# Patient Record
Sex: FEMALE | URBAN - METROPOLITAN AREA
[De-identification: names, ages, dates, MRNs, and addresses within clinical notes are randomized per-mention and may not be internally consistent; named-entity substitution may affect disease eponyms.]

---

## 2019-06-11 ENCOUNTER — OUTPATIENT (OUTPATIENT)
Dept: OUTPATIENT SERVICES | Facility: HOSPITAL | Age: 49
LOS: 1 days | End: 2019-06-11

## 2019-06-25 ENCOUNTER — OUTPATIENT (OUTPATIENT)
Dept: OUTPATIENT SERVICES | Facility: HOSPITAL | Age: 49
LOS: 1 days | End: 2019-06-25

## 2019-07-23 ENCOUNTER — OUTPATIENT (OUTPATIENT)
Dept: OUTPATIENT SERVICES | Facility: HOSPITAL | Age: 49
LOS: 1 days | End: 2019-07-23

## 2020-02-10 PROBLEM — Z00.00 ENCOUNTER FOR PREVENTIVE HEALTH EXAMINATION: Status: ACTIVE | Noted: 2020-02-10

## 2020-02-14 ENCOUNTER — APPOINTMENT (OUTPATIENT)
Dept: BARIATRICS | Facility: CLINIC | Age: 50
End: 2020-02-14
Payer: COMMERCIAL

## 2020-02-14 VITALS
HEIGHT: 67 IN | BODY MASS INDEX: 45.99 KG/M2 | DIASTOLIC BLOOD PRESSURE: 74 MMHG | SYSTOLIC BLOOD PRESSURE: 115 MMHG | WEIGHT: 293 LBS | HEART RATE: 90 BPM

## 2020-02-14 VITALS
HEART RATE: 90 BPM | SYSTOLIC BLOOD PRESSURE: 115 MMHG | BODY MASS INDEX: 45.99 KG/M2 | DIASTOLIC BLOOD PRESSURE: 74 MMHG | WEIGHT: 293 LBS | HEIGHT: 67 IN

## 2020-02-14 DIAGNOSIS — F41.9 ANXIETY DISORDER, UNSPECIFIED: ICD-10-CM

## 2020-02-14 DIAGNOSIS — Z87.09 PERSONAL HISTORY OF OTHER DISEASES OF THE RESPIRATORY SYSTEM: ICD-10-CM

## 2020-02-14 DIAGNOSIS — K52.9 NONINFECTIVE GASTROENTERITIS AND COLITIS, UNSPECIFIED: ICD-10-CM

## 2020-02-14 DIAGNOSIS — F32.9 ANXIETY DISORDER, UNSPECIFIED: ICD-10-CM

## 2020-02-14 DIAGNOSIS — Z80.0 FAMILY HISTORY OF MALIGNANT NEOPLASM OF DIGESTIVE ORGANS: ICD-10-CM

## 2020-02-14 DIAGNOSIS — Z78.9 OTHER SPECIFIED HEALTH STATUS: ICD-10-CM

## 2020-02-14 DIAGNOSIS — Z87.891 PERSONAL HISTORY OF NICOTINE DEPENDENCE: ICD-10-CM

## 2020-02-14 DIAGNOSIS — M47.819 SPONDYLOSIS W/OUT MYELOPATHY OR RADICULOPATHY, SITE UNSPECIFIED: ICD-10-CM

## 2020-02-14 DIAGNOSIS — Z87.442 PERSONAL HISTORY OF URINARY CALCULI: ICD-10-CM

## 2020-02-14 DIAGNOSIS — N39.0 URINARY TRACT INFECTION, SITE NOT SPECIFIED: ICD-10-CM

## 2020-02-14 DIAGNOSIS — Z83.3 FAMILY HISTORY OF DIABETES MELLITUS: ICD-10-CM

## 2020-02-14 DIAGNOSIS — Z80.3 FAMILY HISTORY OF MALIGNANT NEOPLASM OF BREAST: ICD-10-CM

## 2020-02-14 DIAGNOSIS — Z98.84 BARIATRIC SURGERY STATUS: ICD-10-CM

## 2020-02-14 DIAGNOSIS — G89.4 CHRONIC PAIN SYNDROME: ICD-10-CM

## 2020-02-14 DIAGNOSIS — Z80.42 FAMILY HISTORY OF MALIGNANT NEOPLASM OF PROSTATE: ICD-10-CM

## 2020-02-14 PROCEDURE — 99203 OFFICE O/P NEW LOW 30 MIN: CPT

## 2020-02-14 PROCEDURE — 99205 OFFICE O/P NEW HI 60 MIN: CPT

## 2020-02-14 RX ORDER — ALBUTEROL SULFATE 4 MG/1
TABLET ORAL
Refills: 0 | Status: ACTIVE | COMMUNITY

## 2020-02-14 NOTE — HISTORY OF PRESENT ILLNESS
[FreeTextEntry1] : 49 year old female for obesity medicine consultation.\par Patient saw Dr. Jones earlier today- not a candidate for revision surgery.  Had open DS in 2002 with revisions and hernia operations since. \par HIghest weight prior to surgery 330 Lowest weight after surgery 140-150\par Was maintain her weight until she developed severe pain and was diagnosed with an autoimmune condition leading to difficuse pain in her back  and knees.  Has seen pain management and was given ablation in her back x2 without success.  Now not able to exercise at all\par Food recall- B- [employer] bread and butter L- tuna with sesay and pickles D leftover rice, chicken and broccoli- has snack between Breakfast and lunch shake or mixed nuts\par +emotional eating at times +depression to deal with pain- seeing LCSW monthly\par Sleeps only 1 hour at a time due to pain and nocturia\par Occupation- administrative at [employer]\par +ETOH to help cope with pain prior to bed 1-3 drinks \par Previous commercial diets- Nutrisystem, WW, medifast

## 2020-02-14 NOTE — HISTORY OF PRESENT ILLNESS
[de-identified] : Paulino is a 49 year old female who 17 years ago had an open DS my Dr Zepeda who generally does 100 cm channel since that time. She has had multiple hernia operations with mesh, also been diagnosed with an autoimmune disease and has had considerable weight recidivism in the last few years. She did have a nominal attempt with re-sleeve. Additionally following this procedure she did not lose any weight. She is referred by Dr. Disla of [facility]. i reviewed her upper GI which shows a reasonable sleeve that would be at best a nominal target I believe that her intestine has hypertrophied and again would be very difficult to find a therapeutic range making this shorter. I spent considerable time with her today and really believe that the target for surgery are nominal and with her past surgical history the complication rate high and best treatment would be medical therapy and continue to follow up. I also believe that her immunosuppression has a contributory cause and manipulation of the gut bacteria may become a target in the future thus feel strongly that the risk of surgical re-intervention on an elective basis exceeds the likely benefit of the potential targets with her previous surgery. I saw her today with Jennifer Souza and we discussed medical options and this may require a combination of treatments and reasonable expectation management.

## 2020-02-14 NOTE — ASSESSMENT
[FreeTextEntry1] : Patient with complicated surgical and medical history now with weight regain after bariatric surgery not a candidate for a revision\par Patient unable to exercise due to pain in legs- discussed other pain medication options including Medical marijuana.  If able to wean off Cymbalta can consider Contrave and phentermine in the future\par IF FBG elevated or signs of high insulin may also consider metformin\par Has tried Saxenda in the past but she only lost 8 pounds over three months and was told that it contributed to a kidney infection \par Not a candidate for topiramate due to history of kidney stones \par Encouraged patient to find RD Closer to where she lives\par Encouraged her to take up swimming \par Labs fasting to be drawn including 1mg dex suppression test- will call with results

## 2020-02-14 NOTE — PHYSICAL EXAM
[Obese, well nourished, in no acute distress] : obese, well nourished, in no acute distress [Normal] : affect appropriate [de-identified] : +teary

## 2021-11-03 ENCOUNTER — OUTPATIENT (OUTPATIENT)
Dept: OUTPATIENT SERVICES | Facility: HOSPITAL | Age: 51
LOS: 1 days | End: 2021-11-03
Payer: COMMERCIAL

## 2021-11-03 DIAGNOSIS — M81.0 AGE-RELATED OSTEOPOROSIS WITHOUT CURRENT PATHOLOGICAL FRACTURE: ICD-10-CM

## 2021-11-03 DIAGNOSIS — R53.83 OTHER FATIGUE: ICD-10-CM

## 2021-11-03 DIAGNOSIS — M06.89 OTHER SPECIFIED RHEUMATOID ARTHRITIS, MULTIPLE SITES: ICD-10-CM

## 2021-11-03 DIAGNOSIS — E55.9 VITAMIN D DEFICIENCY, UNSPECIFIED: ICD-10-CM

## 2021-11-03 DIAGNOSIS — Z11.1 ENCOUNTER FOR SCREENING FOR RESPIRATORY TUBERCULOSIS: ICD-10-CM

## 2021-11-03 DIAGNOSIS — M06.09 RHEUMATOID ARTHRITIS WITHOUT RHEUMATOID FACTOR, MULTIPLE SITES: ICD-10-CM

## 2021-11-03 DIAGNOSIS — E78.00 PURE HYPERCHOLESTEROLEMIA, UNSPECIFIED: ICD-10-CM

## 2021-11-03 DIAGNOSIS — R68.89 OTHER GENERAL SYMPTOMS AND SIGNS: ICD-10-CM

## 2021-11-03 DIAGNOSIS — Z79.899 OTHER LONG TERM (CURRENT) DRUG THERAPY: ICD-10-CM

## 2021-11-03 DIAGNOSIS — Z11.59 ENCOUNTER FOR SCREENING FOR OTHER VIRAL DISEASES: ICD-10-CM

## 2021-11-03 DIAGNOSIS — R79.82 ELEVATED C-REACTIVE PROTEIN (CRP): ICD-10-CM

## 2021-11-03 PROCEDURE — 85027 COMPLETE CBC AUTOMATED: CPT

## 2021-11-03 PROCEDURE — 80053 COMPREHEN METABOLIC PANEL: CPT

## 2021-11-03 PROCEDURE — 81490 AUTOIMMUNE RA ALYS 12 BMRK: CPT

## 2021-11-03 PROCEDURE — 86769 SARS-COV-2 COVID-19 ANTIBODY: CPT

## 2021-11-03 PROCEDURE — 80061 LIPID PANEL: CPT

## 2021-11-03 PROCEDURE — 36415 COLL VENOUS BLD VENIPUNCTURE: CPT

## 2021-11-04 ENCOUNTER — TRANSCRIPTION ENCOUNTER (OUTPATIENT)
Age: 51
End: 2021-11-04

## 2022-05-16 ENCOUNTER — APPOINTMENT (OUTPATIENT)
Dept: OPHTHALMOLOGY | Facility: CLINIC | Age: 52
End: 2022-05-16
Payer: COMMERCIAL

## 2022-05-16 ENCOUNTER — NON-APPOINTMENT (OUTPATIENT)
Age: 52
End: 2022-05-16

## 2022-05-16 PROCEDURE — 92002 INTRM OPH EXAM NEW PATIENT: CPT

## 2022-06-15 ENCOUNTER — APPOINTMENT (OUTPATIENT)
Dept: OPHTHALMOLOGY | Facility: CLINIC | Age: 52
End: 2022-06-15
Payer: COMMERCIAL

## 2022-06-15 ENCOUNTER — NON-APPOINTMENT (OUTPATIENT)
Age: 52
End: 2022-06-15

## 2022-06-15 PROCEDURE — 99213 OFFICE O/P EST LOW 20 MIN: CPT

## 2022-09-21 ENCOUNTER — RESULT REVIEW (OUTPATIENT)
Age: 52
End: 2022-09-21

## 2022-09-21 ENCOUNTER — APPOINTMENT (OUTPATIENT)
Dept: WOUND CARE | Facility: HOSPITAL | Age: 52
End: 2022-09-21

## 2022-09-21 ENCOUNTER — OUTPATIENT (OUTPATIENT)
Dept: OUTPATIENT SERVICES | Facility: HOSPITAL | Age: 52
LOS: 1 days | Discharge: HOME | End: 2022-09-21
Payer: COMMERCIAL

## 2022-09-21 VITALS
HEART RATE: 76 BPM | RESPIRATION RATE: 20 BRPM | TEMPERATURE: 97 F | HEIGHT: 60 IN | SYSTOLIC BLOOD PRESSURE: 135 MMHG | DIASTOLIC BLOOD PRESSURE: 83 MMHG | OXYGEN SATURATION: 98 %

## 2022-09-21 DIAGNOSIS — Z78.9 OTHER SPECIFIED HEALTH STATUS: ICD-10-CM

## 2022-09-21 DIAGNOSIS — B07.9 VIRAL WART, UNSPECIFIED: ICD-10-CM

## 2022-09-21 DIAGNOSIS — S60.949A UNSPECIFIED SUPERFICIAL INJURY OF UNSPECIFIED FINGER, INITIAL ENCOUNTER: ICD-10-CM

## 2022-09-21 PROCEDURE — 88304 TISSUE EXAM BY PATHOLOGIST: CPT

## 2022-09-21 PROCEDURE — 88304 TISSUE EXAM BY PATHOLOGIST: CPT | Mod: 26

## 2022-09-21 PROCEDURE — 11102 TANGNTL BX SKIN SINGLE LES: CPT

## 2022-09-21 PROCEDURE — G0463: CPT | Mod: 25

## 2022-09-21 PROCEDURE — 99203 OFFICE O/P NEW LOW 30 MIN: CPT | Mod: 25

## 2022-09-21 RX ORDER — DEXAMETHASONE 1 MG/1
1 TABLET ORAL
Qty: 1 | Refills: 0 | Status: COMPLETED | COMMUNITY
Start: 2020-02-14 | End: 2022-09-21

## 2022-09-21 RX ORDER — UPADACITINIB 15 MG/1
15 TABLET, EXTENDED RELEASE ORAL DAILY
Refills: 0 | Status: ACTIVE | COMMUNITY

## 2022-09-21 RX ORDER — USTEKINUMAB 130 MG/26ML
130 SOLUTION INTRAVENOUS
Refills: 0 | Status: COMPLETED | COMMUNITY
End: 2022-09-21

## 2022-09-21 RX ORDER — DULOXETINE HYDROCHLORIDE 60 MG/1
60 CAPSULE, DELAYED RELEASE ORAL
Refills: 0 | Status: COMPLETED | COMMUNITY
End: 2022-09-21

## 2022-09-30 DIAGNOSIS — Z88.8 ALLERGY STATUS TO OTHER DRUGS, MEDICAMENTS AND BIOLOGICAL SUBSTANCES STATUS: ICD-10-CM

## 2022-09-30 DIAGNOSIS — B07.9 VIRAL WART, UNSPECIFIED: ICD-10-CM

## 2022-09-30 DIAGNOSIS — G89.4 CHRONIC PAIN SYNDROME: ICD-10-CM

## 2022-09-30 DIAGNOSIS — Z90.710 ACQUIRED ABSENCE OF BOTH CERVIX AND UTERUS: ICD-10-CM

## 2022-09-30 DIAGNOSIS — Z98.890 OTHER SPECIFIED POSTPROCEDURAL STATES: ICD-10-CM

## 2022-09-30 DIAGNOSIS — Z79.899 OTHER LONG TERM (CURRENT) DRUG THERAPY: ICD-10-CM

## 2022-09-30 DIAGNOSIS — J45.909 UNSPECIFIED ASTHMA, UNCOMPLICATED: ICD-10-CM

## 2022-09-30 DIAGNOSIS — F32.A DEPRESSION, UNSPECIFIED: ICD-10-CM

## 2022-09-30 DIAGNOSIS — Z87.442 PERSONAL HISTORY OF URINARY CALCULI: ICD-10-CM

## 2022-09-30 DIAGNOSIS — F41.9 ANXIETY DISORDER, UNSPECIFIED: ICD-10-CM

## 2022-10-11 NOTE — ASSESSMENT
[Verbal] : Verbal [Written] : Written [Demo] : Demo [Patient] : Patient [Good - alert, interested, motivated] : Good - alert, interested, motivated [Verbalizes knowledge/Understanding] : Verbalizes knowledge/understanding [Skin Care] : skin care [Signs and symptoms of infection] : sign and symptoms of infection [Nutrition] : nutrition [How and When to Call] : how and when to call [Labs and Tests] : labs and tests [Patient responsibility to plan of care] : patient responsibility to plan of care [Stable] : stable [Other: ____] : [unfilled] [Ambulatory] : Ambulatory [Not Applicable - Long Term Care/Home Health Agency] : Long Term Care/Home Health Agency: Not Applicable [] : No [FreeTextEntry2] : Infection Prevention\par Dressing changes\par Pathology [FreeTextEntry3] : Initial Visit [FreeTextEntry4] : Auth submitted for same day skin biopsy.\par Pathology obtained and sent to lab\par F/u in 1 week

## 2022-10-11 NOTE — PROCEDURE
[FreeTextEntry9] : 4069 [de-identified] : small fissue right radial index finger [de-identified] : juan m summers [FreeTextEntry6] : irregular skin growth right index finger [FreeTextEntry7] : same [de-identified] : lidocaine digital block [de-identified] : 1cc [de-identified] : yes and sent to pathology

## 2022-10-11 NOTE — PHYSICAL EXAM
[2 x 2] : 2 x 2  [Normal Heart Sounds] : normal heart sounds [Alert] : alert [Oriented to Person] : oriented to person [Oriented to Place] : oriented to place [Oriented to Time] : oriented to time [Calm] : calm [de-identified] : obese 53 Y/O white female in NAD [de-identified] : 3mm fissure right distal radial index finger in callus area [de-identified] : Pathology obtained and sent to lab [FreeTextEntry1] : Right Hand 2nd Digit ; Fissure [de-identified] : none [de-identified] : Other [de-identified] : none [de-identified] : none [de-identified] : 1 [de-identified] : Mupirocin [de-identified] : \par \par \par Mechanically cleansed with sterile gauze and normal saline 0.9%\par Dry Dressing\par  [de-identified] : No [de-identified] : Normal [de-identified] : None [de-identified] : No [de-identified] : 1% Lidocaine Injection [TWNoteComboBox7] : Other [de-identified] : Biopsy taken and sent for pathology [de-identified] : Daily [de-identified] : Primary Dressing

## 2022-10-11 NOTE — PROCEDURE
[FreeTextEntry9] : 7532 [de-identified] : small fissue right radial index finger [de-identified] : juan m summers [FreeTextEntry6] : irregular skin growth right index finger [FreeTextEntry7] : same [de-identified] : lidocaine digital block [de-identified] : 1cc [de-identified] : yes and sent to pathology

## 2022-10-11 NOTE — REVIEW OF SYSTEMS
[Negative] : Heme/Lymph [FreeTextEntry1] : 0685 [FreeTextEntry2] : obesity [FreeTextEntry9] : rheumatoid arthitis [de-identified] : small lesion right index finger

## 2022-10-11 NOTE — HISTORY OF PRESENT ILLNESS
[FreeTextEntry1] : 53 Y/O white female with 20+ year history of a recurrent growth removed in Dermatologists office on several occasions with liquid nitrogen. Most recently patient attempted self excision with nail clipper. Patient relates that \par area is occasionally painful. Area on finger is also pressure point from writing instrument.Planned for shave excision with cauterization under digital block with plain lidocaine.

## 2022-10-11 NOTE — PLAN
[FreeTextEntry1] : plan shave biopsy/excision of fissured area to pathology and cauterization with digital block 1% plain lidocaine\par mupericin and bandaid right index finger.\par return 1 week\par 30 minutes spent in review,evaluation and treatment planning

## 2022-10-11 NOTE — PHYSICAL EXAM
[2 x 2] : 2 x 2  [Normal Heart Sounds] : normal heart sounds [Alert] : alert [Oriented to Person] : oriented to person [Oriented to Place] : oriented to place [Oriented to Time] : oriented to time [Calm] : calm [de-identified] : obese 51 Y/O white female in NAD [de-identified] : 3mm fissure right distal radial index finger in callus area [de-identified] : Pathology obtained and sent to lab [FreeTextEntry1] : Right Hand 2nd Digit ; Fissure [de-identified] : none [de-identified] : Other [de-identified] : none [de-identified] : none [de-identified] : 1 [de-identified] : Mupirocin [de-identified] : \par \par \par Mechanically cleansed with sterile gauze and normal saline 0.9%\par Dry Dressing\par  [de-identified] : No [de-identified] : Normal [de-identified] : None [de-identified] : No [de-identified] : 1% Lidocaine Injection [TWNoteComboBox7] : Other [de-identified] : Biopsy taken and sent for pathology [de-identified] : Daily [de-identified] : Primary Dressing

## 2022-10-11 NOTE — REVIEW OF SYSTEMS
[Negative] : Heme/Lymph [FreeTextEntry1] : 9469 [FreeTextEntry2] : obesity [FreeTextEntry9] : rheumatoid arthitis [de-identified] : small lesion right index finger

## 2023-04-07 ENCOUNTER — APPOINTMENT (OUTPATIENT)
Dept: BARIATRICS/WEIGHT MGMT | Facility: CLINIC | Age: 53
End: 2023-04-07
Payer: COMMERCIAL

## 2023-04-07 PROCEDURE — 99205 OFFICE O/P NEW HI 60 MIN: CPT | Mod: 95

## 2023-04-07 NOTE — ASSESSMENT
[FreeTextEntry1] : BARIATRIC SURGERY HISTORY:\par pre-op max was 330 lbs\par open DS in 2003\par post-op low weight was 140-150 lbs\par currently back up to 296 lbs\par \par OBESITY COMORBIDITIES: FARSHAD - resolved after surgery, pre-dm\par \par ANTI-OBESITY MEDICATIONS: semaglutide\par \par OBESITY MEDICATION SIDE EFFECTS:nausea\par \par \par Recommend the following:\par obtain metabolic and bariatric labs from outside physician\par whole foods based eating strategy limiting refined carbs and added fats \par needs adequate protein\par recommend avoiding dairy and refined sugar\par continue semaglutide and increase to 1.7mg\par once maxed out consider adding phentermine\par suggest consultation with advanced endoscopy to determine whether an endoscopic revision can help her with weight loss/appetite control\par start bariatric MVI\par \par rtc in 4 weeks.\par

## 2023-04-07 NOTE — HISTORY OF PRESENT ILLNESS
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

## 2023-04-12 ENCOUNTER — OUTPATIENT (OUTPATIENT)
Dept: OUTPATIENT SERVICES | Facility: HOSPITAL | Age: 53
LOS: 1 days | End: 2023-04-12
Payer: COMMERCIAL

## 2023-04-12 DIAGNOSIS — M45.7 ANKYLOSING SPONDYLITIS OF LUMBOSACRAL REGION: ICD-10-CM

## 2023-04-12 DIAGNOSIS — E55.9 VITAMIN D DEFICIENCY, UNSPECIFIED: ICD-10-CM

## 2023-04-12 DIAGNOSIS — D64.9 ANEMIA, UNSPECIFIED: ICD-10-CM

## 2023-04-12 DIAGNOSIS — M46.90 UNSPECIFIED INFLAMMATORY SPONDYLOPATHY, SITE UNSPECIFIED: ICD-10-CM

## 2023-04-12 PROCEDURE — 83550 IRON BINDING TEST: CPT

## 2023-04-12 PROCEDURE — 83735 ASSAY OF MAGNESIUM: CPT

## 2023-04-12 PROCEDURE — 85025 COMPLETE CBC W/AUTO DIFF WBC: CPT

## 2023-04-12 PROCEDURE — 83540 ASSAY OF IRON: CPT

## 2023-04-12 PROCEDURE — 36415 COLL VENOUS BLD VENIPUNCTURE: CPT

## 2023-04-12 PROCEDURE — 85652 RBC SED RATE AUTOMATED: CPT

## 2023-04-12 PROCEDURE — 86140 C-REACTIVE PROTEIN: CPT

## 2023-04-12 PROCEDURE — 82306 VITAMIN D 25 HYDROXY: CPT

## 2023-04-12 PROCEDURE — 80053 COMPREHEN METABOLIC PANEL: CPT

## 2023-04-12 PROCEDURE — 84100 ASSAY OF PHOSPHORUS: CPT

## 2023-05-12 ENCOUNTER — APPOINTMENT (OUTPATIENT)
Dept: BARIATRICS/WEIGHT MGMT | Facility: CLINIC | Age: 53
End: 2023-05-12
Payer: COMMERCIAL

## 2023-05-12 PROCEDURE — 99213 OFFICE O/P EST LOW 20 MIN: CPT | Mod: 95

## 2023-05-18 NOTE — ASSESSMENT
[FreeTextEntry1] : BARIATRIC SURGERY HISTORY:\par pre-op max was 330 lbs\par open DS in 2003\par post-op low weight was 140-150 lbs\par currently back up to 296 lbs\par \par OBESITY COMORBIDITIES: FARSHAD - resolved after surgery, pre-dm\par \par ANTI-OBESITY MEDICATIONS: semaglutide\par \par OBESITY MEDICATION SIDE EFFECTS:nausea\par \par \par Recommend the following:\par still need to obtain metabolic and bariatric labs from outside physician\par whole foods based eating strategy limiting refined carbs and added fats \par needs adequate protein\par recommend avoiding dairy and refined sugar\par increase semaglutide to 2.4 mg\par once maxed out consider adding phentermine\par still think she might benefit from endoscopic revision, but there are multiple pharmacologic and lifestyle measures that can be taken advantage of first\par cont  bariatric MVI\par \par rtc in 4 weeks.\par

## 2023-05-18 NOTE — HISTORY OF PRESENT ILLNESS
[FreeTextEntry1] : 51 yo woman with obesity (s/p duodenal switch in 2003), hx of FARSHAD, ankylosing sponylitis and rheumatoid arthritis returns for obesity medicine f/u\par \par pre-op max was 330 lbs\par open DS in 2003\par post-op low weight was 140-150 lbs\par currently back up to 296 lbs - thinks weight is not much changed since then\par \par has tolerated increase of semaglutide to 1.7mg but has not noticed much of a change otherwise\par was unable to get appointment with advanced gastroenterologist to consider endoscopic revision to decrease appetite\par \par she is otherwise without new complaints\par

## 2023-05-18 NOTE — REASON FOR VISIT
[Follow-Up] : a follow-up visit [FreeTextEntry1] : obesity [Home] : at home, [unfilled] , at the time of the visit. [Other Location: e.g. Home (Enter Location, City,State)___] : at [unfilled] [Patient] : the patient

## 2023-06-21 ENCOUNTER — APPOINTMENT (OUTPATIENT)
Dept: BARIATRICS/WEIGHT MGMT | Facility: CLINIC | Age: 53
End: 2023-06-21
Payer: COMMERCIAL

## 2023-06-21 DIAGNOSIS — R11.0 NAUSEA: ICD-10-CM

## 2023-06-21 PROCEDURE — 99213 OFFICE O/P EST LOW 20 MIN: CPT | Mod: 95

## 2023-06-21 RX ORDER — ONDANSETRON 4 MG/1
4 TABLET ORAL
Qty: 30 | Refills: 1 | Status: ACTIVE | COMMUNITY
Start: 2023-06-21 | End: 1900-01-01

## 2023-06-26 NOTE — ASSESSMENT
[FreeTextEntry1] : BARIATRIC SURGERY HISTORY:\par pre-op max was 330 lbs\par open DS in 2003\par post-op low weight was 140-150 lbs\par currently back up to 296 lbs\par \par OBESITY COMORBIDITIES: FARSHAD - resolved after surgery, pre-dm\par \par ANTI-OBESITY MEDICATIONS: semaglutide\par \par OBESITY MEDICATION SIDE EFFECTS:nausea\par \par \par Recommend the following:\par still need to obtain metabolic and bariatric labs from outside physician\par whole foods based eating strategy limiting refined carbs and added fats \par needs adequate protein\par recommend avoiding dairy and refined sugar\par increase semaglutide to 2.4 mg\par add phentermine 37.5mg (start with 1/2 tab/day)\par prn zofran for nausea\par should be working with RD\par \par cont  bariatric MVI\par \par rtc in 4 weeks.\par

## 2023-06-26 NOTE — HISTORY OF PRESENT ILLNESS
[FreeTextEntry1] : 54 yo woman with obesity (s/p duodenal switch in 2003), hx of FARSHAD, ankylosing sponylitis and rheumatoid arthritis returns for obesity medicine f/u\par \par pre-op max was 330 lbs\par open DS in 2003\par post-op low weight was 140-150 lbs\par current weight = 292 lbs\par \par has increased of semaglutide to 2.4mg with modest decrease in appetite but also some nausea\par not much else in terms of changes\par \par has lost 4 lbs since last visit but still concerned about slow nature of weight loss.\par

## 2023-07-05 ENCOUNTER — NON-APPOINTMENT (OUTPATIENT)
Age: 53
End: 2023-07-05

## 2023-07-05 ENCOUNTER — APPOINTMENT (OUTPATIENT)
Dept: OPHTHALMOLOGY | Facility: CLINIC | Age: 53
End: 2023-07-05
Payer: COMMERCIAL

## 2023-07-05 PROCEDURE — 92014 COMPRE OPH EXAM EST PT 1/>: CPT

## 2023-08-18 ENCOUNTER — APPOINTMENT (OUTPATIENT)
Dept: BARIATRICS/WEIGHT MGMT | Facility: CLINIC | Age: 53
End: 2023-08-18
Payer: COMMERCIAL

## 2023-08-18 ENCOUNTER — OUTPATIENT (OUTPATIENT)
Dept: OUTPATIENT SERVICES | Facility: HOSPITAL | Age: 53
LOS: 1 days | End: 2023-08-18

## 2023-08-18 DIAGNOSIS — I10 ESSENTIAL (PRIMARY) HYPERTENSION: ICD-10-CM

## 2023-08-18 PROCEDURE — 99213 OFFICE O/P EST LOW 20 MIN: CPT | Mod: 95

## 2023-08-22 NOTE — HISTORY OF PRESENT ILLNESS
[FreeTextEntry1] : 54 yo woman with obesity (s/p duodenal switch in 2003), hx of FARSHAD, ankylosing sponylitis and rheumatoid arthritis returns for obesity medicine f/u  pre-op max was 330 lbs open DS in 2003 post-op low weight was 140-150 lbs current weight = 274 lbs (18 lbs in last 8 weeks)  Started on phentermine 1/2 tab now advanced to full dose  On semaglutide to 2.4mg tolerating well not much else in terms of changes   Pt very pleased with overall progress. .

## 2023-08-22 NOTE — ASSESSMENT
[FreeTextEntry1] : BARIATRIC SURGERY HISTORY: pre-op max was 330 lbs open DS in 2003 post-op low weight was 140-150 lbs currently back up to 274 lbs  OBESITY COMORBIDITIES: FARSHAD - resolved after surgery, pre-dm  ANTI-OBESITY MEDICATIONS: semaglutide, phentermine  OBESITY MEDICATION SIDE EFFECTS:nausea   Recommend the following: still need to obtain metabolic and bariatric labs from outside physician whole foods based eating strategy limiting refined carbs and added fats  needs adequate protein/fibers. Limit protein supplements/can use plant-based if needed recommend avoiding dairy and refined sugar c/w semaglutide  2.4 mg c/w phentermine 37.5mg  prn zofran for nausea should be working with MIRIAM saul  bariatric MVI  rtc in 4-6 weeks.

## 2023-08-22 NOTE — REASON FOR VISIT
[Follow-Up] : a follow-up visit [Home] : at home, [unfilled] , at the time of the visit. [Other Location: e.g. Home (Enter Location, City,State)___] : at [unfilled] [Patient] : the patient [FreeTextEntry1] : obesity [Other:____] : [unfilled]

## 2023-09-02 ENCOUNTER — APPOINTMENT (OUTPATIENT)
Dept: MRI IMAGING | Facility: CLINIC | Age: 53
End: 2023-09-02
Payer: COMMERCIAL

## 2023-09-02 ENCOUNTER — OUTPATIENT (OUTPATIENT)
Dept: OUTPATIENT SERVICES | Facility: HOSPITAL | Age: 53
LOS: 1 days | End: 2023-09-02
Payer: COMMERCIAL

## 2023-09-02 DIAGNOSIS — Z00.8 ENCOUNTER FOR OTHER GENERAL EXAMINATION: ICD-10-CM

## 2023-09-02 PROCEDURE — 72141 MRI NECK SPINE W/O DYE: CPT

## 2023-09-02 PROCEDURE — 72148 MRI LUMBAR SPINE W/O DYE: CPT

## 2023-09-02 PROCEDURE — 73721 MRI JNT OF LWR EXTRE W/O DYE: CPT | Mod: 26,RT

## 2023-09-02 PROCEDURE — 73721 MRI JNT OF LWR EXTRE W/O DYE: CPT

## 2023-09-02 PROCEDURE — 72148 MRI LUMBAR SPINE W/O DYE: CPT | Mod: 26

## 2023-09-02 PROCEDURE — 72141 MRI NECK SPINE W/O DYE: CPT | Mod: 26

## 2023-09-02 PROCEDURE — 72195 MRI PELVIS W/O DYE: CPT | Mod: 26

## 2023-09-02 PROCEDURE — 72195 MRI PELVIS W/O DYE: CPT

## 2023-09-05 ENCOUNTER — APPOINTMENT (OUTPATIENT)
Dept: ORTHOPEDIC SURGERY | Facility: CLINIC | Age: 53
End: 2023-09-05
Payer: COMMERCIAL

## 2023-09-05 ENCOUNTER — NON-APPOINTMENT (OUTPATIENT)
Age: 53
End: 2023-09-05

## 2023-09-05 VITALS
HEART RATE: 80 BPM | WEIGHT: 270 LBS | HEIGHT: 67.5 IN | DIASTOLIC BLOOD PRESSURE: 82 MMHG | BODY MASS INDEX: 41.88 KG/M2 | OXYGEN SATURATION: 99 % | SYSTOLIC BLOOD PRESSURE: 120 MMHG

## 2023-09-05 DIAGNOSIS — M25.551 PAIN IN RIGHT HIP: ICD-10-CM

## 2023-09-05 PROCEDURE — 73502 X-RAY EXAM HIP UNI 2-3 VIEWS: CPT

## 2023-09-05 PROCEDURE — 99205 OFFICE O/P NEW HI 60 MIN: CPT

## 2023-09-05 NOTE — ADDENDUM
[FreeTextEntry1] : This note was authored by Song Saleh working as a medical scribe for Dr. Peewee Llanes. The note was reviewed, edited, and revised by Dr. Peewee Llanes whom is in agreement with the exam findings, imaging findings, and treatment plan. 09/05/2023

## 2023-09-05 NOTE — HISTORY OF PRESENT ILLNESS
[de-identified] : Ms. ARLETH BRYAN is a 53 year old female presenting for evaluation of right hip pain for the past two months. She has a history of Ankylosing Spondylitis, which was miss diagnosed as fibromyalgia for years. She was on steroids long term but is now on [place]. Patient takes localizes the pain to the groin. She states that the pain is becoming debilitating. She states the pain is worse with all weightbearing activity including walking long distance and rising from a seated position. Patient has not had injections. She has tired PT and NSAIds without improvement.

## 2023-09-05 NOTE — DISCUSSION/SUMMARY
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 The articular surface incongruity is mild at this point but will likely progress.  She needs time to work on weight loss prior to scheduling right total hip replacement. She has a goal of losing approximately 15 pounds in order to drop her BMI below 40. She declines an order for an image guided intraarticular cortisone injection in the right hip for pain relief while she works on weight loss.  I have recommended a right total hip replacement for this patient. A long discussion took place with the patient describing what a total joint replacement is and what the procedure would entail. A hip model, similar to the implants that will be used during the operation, was utilized to demonstrate the implants. Choices of implant manufactures were discussed and reviewed. The ability to secure the implant utilizing cement or cementless (press fit) fixation was discussed. Anterior and posterior exposures were discussed. For this patient a posterior approach is appropriate. The patient agrees with the plan of care as well as the use of implants.  The hospitalization and post-operative care and rehabilitation were also discussed. The use of perioperative antibiotics and DVT prophylaxis were discussed. The risk, benefits and alternatives to a surgical intervention were discussed at length with the patient. The patient was also advised of risks related to the medical comorbidities and elevated body mass index (BMI). A lengthy discussion took place to review the most common complications including but not limited to: deep vein thrombosis, pulmonary embolus, heart attack, stroke, infection, wound breakdown, numbness, damage to nerves, tendon, muscles, arteries or other blood vessels, death and other possible complications from anesthesia. The patient was told that we will take steps to minimize these risks by using sterile technique, antibiotics and DVT prophylaxis when appropriate and follow the patient postoperatively in the office setting to monitor progress. The possibility of recurrent pain, no improvement in pain and actual worsening of pain were also discussed with the patient.  The discharge plan of care focused on the patient going home following surgery. The patient was encouraged to make the necessary arrangements to have someone stay with them when they are discharged home. Following discharge, a home care nurse will visit the patient. The home care nurse will open your home care case and request home physical therapy services. Home physical therapy will commence following discharge provided it is appropriate and covered by the health insurance benefit plan.  The benefits of surgery were discussed with the patient including the potential for improving the patient's current clinical condition through operative intervention. Alternatives to surgical intervention including continued conservative management were also discussed in detail. All questions were answered to the satisfaction of the patient. The treatment plan of care, as well as a model of a total hip equivalent to the one that will be used for their total joint replacement, was shared with the patient. The patient participated and agreed to the plan of care as well as the use of the recommended implants for their total hip replacement surgery.	   We are planning for robotic assistance for the total hip replacement. We discussed that this will require placement of iliac crest pins in the ipsilateral iliac crest for pelvic bone registration to allow for robotic guidance for the surgery. We will utilize robotic assistance to improve accuracy of the implants, and accurate restoration of both leg lengths and femoral offset.

## 2023-09-06 ENCOUNTER — APPOINTMENT (OUTPATIENT)
Dept: SPINE | Facility: CLINIC | Age: 53
End: 2023-09-06
Payer: COMMERCIAL

## 2023-09-06 VITALS
OXYGEN SATURATION: 95 % | DIASTOLIC BLOOD PRESSURE: 91 MMHG | BODY MASS INDEX: 41.88 KG/M2 | WEIGHT: 270 LBS | SYSTOLIC BLOOD PRESSURE: 129 MMHG | HEART RATE: 71 BPM | HEIGHT: 67.5 IN

## 2023-09-06 DIAGNOSIS — M54.41 LUMBAGO WITH SCIATICA, LEFT SIDE: ICD-10-CM

## 2023-09-06 DIAGNOSIS — G89.29 LUMBAGO WITH SCIATICA, LEFT SIDE: ICD-10-CM

## 2023-09-06 DIAGNOSIS — M54.42 LUMBAGO WITH SCIATICA, LEFT SIDE: ICD-10-CM

## 2023-09-06 DIAGNOSIS — G89.29 CERVICALGIA: ICD-10-CM

## 2023-09-06 DIAGNOSIS — M54.2 CERVICALGIA: ICD-10-CM

## 2023-09-06 PROCEDURE — 99204 OFFICE O/P NEW MOD 45 MIN: CPT

## 2023-09-13 ENCOUNTER — APPOINTMENT (OUTPATIENT)
Dept: BARIATRICS/WEIGHT MGMT | Facility: CLINIC | Age: 53
End: 2023-09-13
Payer: COMMERCIAL

## 2023-09-13 PROCEDURE — 97802 MEDICAL NUTRITION INDIV IN: CPT | Mod: 95

## 2023-09-14 VITALS — HEIGHT: 67.5 IN | WEIGHT: 269 LBS | BODY MASS INDEX: 41.73 KG/M2

## 2023-09-15 ENCOUNTER — APPOINTMENT (OUTPATIENT)
Dept: CT IMAGING | Facility: CLINIC | Age: 53
End: 2023-09-15
Payer: COMMERCIAL

## 2023-09-15 ENCOUNTER — OUTPATIENT (OUTPATIENT)
Dept: OUTPATIENT SERVICES | Facility: HOSPITAL | Age: 53
LOS: 1 days | End: 2023-09-15
Payer: COMMERCIAL

## 2023-09-15 DIAGNOSIS — Z00.8 ENCOUNTER FOR OTHER GENERAL EXAMINATION: ICD-10-CM

## 2023-09-15 PROCEDURE — 72125 CT NECK SPINE W/O DYE: CPT

## 2023-09-15 PROCEDURE — 72125 CT NECK SPINE W/O DYE: CPT | Mod: 26

## 2023-09-19 ENCOUNTER — APPOINTMENT (OUTPATIENT)
Dept: ORTHOPEDIC SURGERY | Facility: CLINIC | Age: 53
End: 2023-09-19
Payer: COMMERCIAL

## 2023-09-19 VITALS — HEIGHT: 67.5 IN | BODY MASS INDEX: 40.95 KG/M2 | WEIGHT: 264 LBS

## 2023-09-19 PROCEDURE — 99213 OFFICE O/P EST LOW 20 MIN: CPT

## 2023-09-26 ENCOUNTER — APPOINTMENT (OUTPATIENT)
Dept: ULTRASOUND IMAGING | Facility: CLINIC | Age: 53
End: 2023-09-26

## 2023-09-29 ENCOUNTER — APPOINTMENT (OUTPATIENT)
Dept: BARIATRICS/WEIGHT MGMT | Facility: CLINIC | Age: 53
End: 2023-09-29
Payer: COMMERCIAL

## 2023-09-29 ENCOUNTER — OUTPATIENT (OUTPATIENT)
Dept: OUTPATIENT SERVICES | Facility: HOSPITAL | Age: 53
LOS: 1 days | End: 2023-09-29

## 2023-09-29 DIAGNOSIS — I10 ESSENTIAL (PRIMARY) HYPERTENSION: ICD-10-CM

## 2023-09-29 PROCEDURE — 99213 OFFICE O/P EST LOW 20 MIN: CPT | Mod: 95

## 2023-10-05 ENCOUNTER — APPOINTMENT (OUTPATIENT)
Dept: DERMATOLOGY | Facility: CLINIC | Age: 53
End: 2023-10-05
Payer: COMMERCIAL

## 2023-10-05 DIAGNOSIS — D23.9 OTHER BENIGN NEOPLASM OF SKIN, UNSPECIFIED: ICD-10-CM

## 2023-10-05 DIAGNOSIS — L72.0 EPIDERMAL CYST: ICD-10-CM

## 2023-10-05 DIAGNOSIS — D22.9 MELANOCYTIC NEVI, UNSPECIFIED: ICD-10-CM

## 2023-10-05 DIAGNOSIS — Z12.83 ENCOUNTER FOR SCREENING FOR MALIGNANT NEOPLASM OF SKIN: ICD-10-CM

## 2023-10-05 DIAGNOSIS — L82.0 INFLAMED SEBORRHEIC KERATOSIS: ICD-10-CM

## 2023-10-05 DIAGNOSIS — L73.8 OTHER SPECIFIED FOLLICULAR DISORDERS: ICD-10-CM

## 2023-10-05 PROCEDURE — 99203 OFFICE O/P NEW LOW 30 MIN: CPT | Mod: 25

## 2023-10-05 PROCEDURE — 17110 DESTRUCTION B9 LES UP TO 14: CPT

## 2023-10-05 RX ORDER — SEMAGLUTIDE 2.4 MG/.75ML
2.4 INJECTION, SOLUTION SUBCUTANEOUS
Qty: 9 | Refills: 1 | Status: ACTIVE | COMMUNITY
Start: 2023-05-12 | End: 1900-01-01

## 2023-10-09 ENCOUNTER — TRANSCRIPTION ENCOUNTER (OUTPATIENT)
Age: 53
End: 2023-10-09

## 2023-10-10 ENCOUNTER — TRANSCRIPTION ENCOUNTER (OUTPATIENT)
Age: 53
End: 2023-10-10

## 2023-10-11 ENCOUNTER — TRANSCRIPTION ENCOUNTER (OUTPATIENT)
Age: 53
End: 2023-10-11

## 2023-10-16 ENCOUNTER — APPOINTMENT (OUTPATIENT)
Dept: ORTHOPEDIC SURGERY | Facility: CLINIC | Age: 53
End: 2023-10-16

## 2023-10-17 ENCOUNTER — APPOINTMENT (OUTPATIENT)
Dept: ORTHOPEDIC SURGERY | Facility: CLINIC | Age: 53
End: 2023-10-17
Payer: COMMERCIAL

## 2023-10-17 VITALS — BODY MASS INDEX: 39.71 KG/M2 | HEIGHT: 67.5 IN | WEIGHT: 256 LBS

## 2023-10-17 PROCEDURE — 73502 X-RAY EXAM HIP UNI 2-3 VIEWS: CPT

## 2023-10-17 PROCEDURE — 99204 OFFICE O/P NEW MOD 45 MIN: CPT

## 2023-10-27 ENCOUNTER — APPOINTMENT (OUTPATIENT)
Dept: BARIATRICS/WEIGHT MGMT | Facility: CLINIC | Age: 53
End: 2023-10-27
Payer: COMMERCIAL

## 2023-10-27 ENCOUNTER — OUTPATIENT (OUTPATIENT)
Dept: OUTPATIENT SERVICES | Facility: HOSPITAL | Age: 53
LOS: 1 days | End: 2023-10-27
Payer: COMMERCIAL

## 2023-10-27 DIAGNOSIS — I10 ESSENTIAL (PRIMARY) HYPERTENSION: ICD-10-CM

## 2023-10-27 PROCEDURE — G0463: CPT

## 2023-10-27 PROCEDURE — 99213 OFFICE O/P EST LOW 20 MIN: CPT | Mod: 95

## 2023-10-27 RX ORDER — SEMAGLUTIDE 1.7 MG/.75ML
1.7 INJECTION, SOLUTION SUBCUTANEOUS DAILY
Qty: 1 | Refills: 5 | Status: DISCONTINUED | COMMUNITY
Start: 2023-04-07 | End: 2023-10-27

## 2023-11-06 DIAGNOSIS — E66.01 MORBID (SEVERE) OBESITY DUE TO EXCESS CALORIES: ICD-10-CM

## 2023-11-15 ENCOUNTER — APPOINTMENT (OUTPATIENT)
Dept: ORTHOPEDIC SURGERY | Facility: HOSPITAL | Age: 53
End: 2023-11-15
Payer: COMMERCIAL

## 2023-11-15 PROCEDURE — 27130 TOTAL HIP ARTHROPLASTY: CPT | Mod: AS,RT

## 2023-11-15 PROCEDURE — 20610 DRAIN/INJ JOINT/BURSA W/O US: CPT | Mod: 59,RT

## 2023-11-15 PROCEDURE — 27130 TOTAL HIP ARTHROPLASTY: CPT | Mod: RT

## 2023-12-01 ENCOUNTER — APPOINTMENT (OUTPATIENT)
Dept: ORTHOPEDIC SURGERY | Facility: CLINIC | Age: 53
End: 2023-12-01
Payer: COMMERCIAL

## 2023-12-01 DIAGNOSIS — M87.051 IDIOPATHIC ASEPTIC NECROSIS OF RIGHT FEMUR: ICD-10-CM

## 2023-12-01 PROCEDURE — 99024 POSTOP FOLLOW-UP VISIT: CPT

## 2023-12-13 ENCOUNTER — TRANSCRIPTION ENCOUNTER (OUTPATIENT)
Age: 53
End: 2023-12-13

## 2023-12-15 ENCOUNTER — APPOINTMENT (OUTPATIENT)
Dept: BARIATRICS/WEIGHT MGMT | Facility: CLINIC | Age: 53
End: 2023-12-15
Payer: COMMERCIAL

## 2023-12-15 ENCOUNTER — OUTPATIENT (OUTPATIENT)
Dept: OUTPATIENT SERVICES | Facility: HOSPITAL | Age: 53
LOS: 1 days | End: 2023-12-15
Payer: COMMERCIAL

## 2023-12-15 DIAGNOSIS — E66.01 MORBID (SEVERE) OBESITY DUE TO EXCESS CALORIES: ICD-10-CM

## 2023-12-15 PROCEDURE — G0463: CPT

## 2023-12-15 PROCEDURE — 99213 OFFICE O/P EST LOW 20 MIN: CPT | Mod: 95

## 2023-12-15 NOTE — HISTORY OF PRESENT ILLNESS
[FreeTextEntry1] : 54 yo woman with obesity (s/p duodenal switch in 2003), hx of FARSHAD, ankylosing sponylitis and rheumatoid arthritis returns for obesity medicine f/u  pre-op max was 330 lbs open DS in 2003 post-op low weight was 140-150 lbs current weight = 245 lbs  lbs (down another 8 lbs)  now s/p hip replacement walking with cane and in PT 3x/week remains on semaglutide to 2.4mg - has nausea and using zofran Phentermine 37.5mg still having shake/day and meals but affected by nausea  happy with overall progress

## 2023-12-15 NOTE — REASON FOR VISIT
[Home] : at home, [unfilled] , at the time of the visit. [Other Location: e.g. Home (Enter Location, City,State)___] : at [unfilled] [Patient] : the patient [Follow-Up] : a follow-up visit [FreeTextEntry1] : Obesity

## 2023-12-15 NOTE — ASSESSMENT
[FreeTextEntry1] : BARIATRIC SURGERY HISTORY: pre-op max was 330 lbs open DS in 2003 post-op low weight was 140-150 lbs went back up to 274 lbs current weight = 245 lbs  OBESITY COMORBIDITIES: FARSHAD - resolved after surgery, pre-dm ANTI-OBESITY MEDICATIONS: semaglutide, phentermine OBESITY MEDICATION SIDE EFFECTS:nausea, constipation   Recommend the following: whole foods based eating strategy limiting refined carbs and added fats - protein sparing c/w avoiding dairy and refined sugar c/w semaglutide 2.4 mg - will switch to tirzepatide 5mg in January as she still has nausea with semaglutide c/w phentermine 37.5mg c/w working with RD and transition to full food diet over next couple of monthsd cont bariatric MVI cont  zofran prn for nausea but hopefully will not need if on tirazepatide   Return to [facility] clinic in 4-6 wks.

## 2023-12-18 DIAGNOSIS — I10 ESSENTIAL (PRIMARY) HYPERTENSION: ICD-10-CM

## 2023-12-22 RX ORDER — MELOXICAM 15 MG/1
15 TABLET ORAL
Qty: 30 | Refills: 0 | Status: ACTIVE | COMMUNITY
Start: 2023-12-22 | End: 1900-01-01

## 2024-01-09 ENCOUNTER — APPOINTMENT (OUTPATIENT)
Dept: ORTHOPEDIC SURGERY | Facility: CLINIC | Age: 54
End: 2024-01-09
Payer: COMMERCIAL

## 2024-01-09 VITALS — BODY MASS INDEX: 37.23 KG/M2 | HEIGHT: 67.5 IN | WEIGHT: 240 LBS

## 2024-01-09 PROCEDURE — 99024 POSTOP FOLLOW-UP VISIT: CPT

## 2024-01-09 PROCEDURE — 73502 X-RAY EXAM HIP UNI 2-3 VIEWS: CPT

## 2024-01-18 ENCOUNTER — OUTPATIENT (OUTPATIENT)
Dept: OUTPATIENT SERVICES | Facility: HOSPITAL | Age: 54
LOS: 1 days | End: 2024-01-18
Payer: COMMERCIAL

## 2024-01-18 ENCOUNTER — APPOINTMENT (OUTPATIENT)
Dept: RADIOLOGY | Facility: CLINIC | Age: 54
End: 2024-01-18
Payer: COMMERCIAL

## 2024-01-18 ENCOUNTER — APPOINTMENT (OUTPATIENT)
Dept: ULTRASOUND IMAGING | Facility: CLINIC | Age: 54
End: 2024-01-18
Payer: COMMERCIAL

## 2024-01-18 DIAGNOSIS — N20.0 CALCULUS OF KIDNEY: ICD-10-CM

## 2024-01-18 DIAGNOSIS — Z00.8 ENCOUNTER FOR OTHER GENERAL EXAMINATION: ICD-10-CM

## 2024-01-18 PROCEDURE — 76770 US EXAM ABDO BACK WALL COMP: CPT

## 2024-01-18 PROCEDURE — 74018 RADEX ABDOMEN 1 VIEW: CPT | Mod: 26

## 2024-01-18 PROCEDURE — 76770 US EXAM ABDO BACK WALL COMP: CPT | Mod: 26

## 2024-01-18 PROCEDURE — 74018 RADEX ABDOMEN 1 VIEW: CPT

## 2024-01-21 ENCOUNTER — NON-APPOINTMENT (OUTPATIENT)
Age: 54
End: 2024-01-21

## 2024-01-24 ENCOUNTER — NON-APPOINTMENT (OUTPATIENT)
Age: 54
End: 2024-01-24

## 2024-01-26 ENCOUNTER — APPOINTMENT (OUTPATIENT)
Dept: BARIATRICS/WEIGHT MGMT | Facility: CLINIC | Age: 54
End: 2024-01-26

## 2024-01-30 DIAGNOSIS — J04.0 ACUTE LARYNGITIS: ICD-10-CM

## 2024-01-30 RX ORDER — PREDNISONE 20 MG/1
20 TABLET ORAL DAILY
Qty: 10 | Refills: 0 | Status: ACTIVE | COMMUNITY
Start: 2024-01-30 | End: 1900-01-01

## 2024-02-08 ENCOUNTER — APPOINTMENT (OUTPATIENT)
Dept: ORTHOPEDIC SURGERY | Facility: CLINIC | Age: 54
End: 2024-02-08
Payer: COMMERCIAL

## 2024-02-08 DIAGNOSIS — Z96.641 PRESENCE OF RIGHT ARTIFICIAL HIP JOINT: ICD-10-CM

## 2024-02-08 PROCEDURE — 99213 OFFICE O/P EST LOW 20 MIN: CPT | Mod: 24

## 2024-02-08 PROCEDURE — 73502 X-RAY EXAM HIP UNI 2-3 VIEWS: CPT

## 2024-02-08 NOTE — REASON FOR VISIT
[FreeTextEntry2] : here today for f/u right REFUGIO 11/15/23.  c/o cant go up stairs with right leg.

## 2024-02-08 NOTE — PHYSICAL EXAM
[5___] : adduction 5[unfilled]/5 [] : patient ambulates without assistive device [Right] : right hip with pelvis [All Views] : anteroposterior, lateral [Components well fixed, in good position] : Components well fixed, in good position [FreeTextEntry9] : screw in acetabulum

## 2024-02-08 NOTE — DISCUSSION/SUMMARY
[de-identified] : Patient is progressing well at this time. Upon inspection of the incision, the area was clean, dry and there were no signs of infection. Discussion was had with the patient regarding the importance of working on ROM at this time both with PT and at home using HEP shown today. Discussed the importance of gaining and maintaining good ROM and its involvement in daily life.   f/u 9 months

## 2024-02-16 ENCOUNTER — APPOINTMENT (OUTPATIENT)
Dept: BARIATRICS/WEIGHT MGMT | Facility: CLINIC | Age: 54
End: 2024-02-16
Payer: COMMERCIAL

## 2024-02-16 ENCOUNTER — OUTPATIENT (OUTPATIENT)
Dept: OUTPATIENT SERVICES | Facility: HOSPITAL | Age: 54
LOS: 1 days | End: 2024-02-16
Payer: COMMERCIAL

## 2024-02-16 VITALS — HEIGHT: 67.5 IN | WEIGHT: 236 LBS | BODY MASS INDEX: 36.61 KG/M2

## 2024-02-16 DIAGNOSIS — I10 ESSENTIAL (PRIMARY) HYPERTENSION: ICD-10-CM

## 2024-02-16 PROCEDURE — G0463: CPT

## 2024-02-16 PROCEDURE — 99213 OFFICE O/P EST LOW 20 MIN: CPT | Mod: 95

## 2024-02-16 RX ORDER — TIRZEPATIDE 5 MG/.5ML
5 INJECTION, SOLUTION SUBCUTANEOUS
Qty: 4 | Refills: 5 | Status: ACTIVE | COMMUNITY
Start: 2024-02-16 | End: 1900-01-01

## 2024-02-16 NOTE — ASSESSMENT
[FreeTextEntry1] : BARIATRIC SURGERY HISTORY: pre-op max was 330 lbs open DS in 2003 post-op low weight was 140-150 lbs went back up to 274 lbs current weight = 236  lbs  OBESITY COMORBIDITIES: FARSHAD - resolved after surgery, pre-dm ANTI-OBESITY MEDICATIONS: semaglutide, phentermine OBESITY MEDICATION SIDE EFFECTS:nausea, constipation   Recommend the following: whole foods based eating strategy limiting refined carbs and added fats - protein sparing c/w avoiding dairy and refined sugar  will switch to tirzepatide 5mg  c/w phentermine 37.5mg c/w working with RD and transition to full food diet over next couple of months cont bariatric MVI cont  zofran prn for nausea but hopefully will not need if on tirazepatide   Return to OM clinic in 4-6 wks.

## 2024-02-16 NOTE — HISTORY OF PRESENT ILLNESS
[FreeTextEntry1] : 52 yo woman with obesity (s/p duodenal switch in 2003), hx of FARSHAD, ankylosing sponylitis and rheumatoid arthritis returns for obesity medicine f/u  pre-op max was 330 lbs open DS in 2003 post-op low weight was 140-150 lbs current weight =  236 lbs  - down another 9 lbs since last visit  feels she lost weight only once she had COVID enrolled in the [facility] a few months hip replacement walking with cane and in PT 3x/week remains on semaglutide to 2.4mg  Phentermine 37.5mg still having shake/day and then 2 meals  happy with overall progress

## 2024-02-28 DIAGNOSIS — E66.01 MORBID (SEVERE) OBESITY DUE TO EXCESS CALORIES: ICD-10-CM

## 2024-02-29 ENCOUNTER — OUTPATIENT (OUTPATIENT)
Dept: OUTPATIENT SERVICES | Facility: HOSPITAL | Age: 54
LOS: 1 days | End: 2024-02-29
Payer: COMMERCIAL

## 2024-02-29 DIAGNOSIS — R76.0 RAISED ANTIBODY TITER: ICD-10-CM

## 2024-02-29 DIAGNOSIS — Z79.1 LONG TERM (CURRENT) USE OF NON-STEROIDAL ANTI-INFLAMMATORIES (NSAID): ICD-10-CM

## 2024-02-29 DIAGNOSIS — N20.0 CALCULUS OF KIDNEY: ICD-10-CM

## 2024-02-29 DIAGNOSIS — N28.9 DISORDER OF KIDNEY AND URETER, UNSPECIFIED: ICD-10-CM

## 2024-02-29 DIAGNOSIS — M45.8 ANKYLOSING SPONDYLITIS SACRAL AND SACROCOCCYGEAL REGION: ICD-10-CM

## 2024-02-29 DIAGNOSIS — Z79.52 LONG TERM (CURRENT) USE OF SYSTEMIC STEROIDS: ICD-10-CM

## 2024-02-29 DIAGNOSIS — E03.9 HYPOTHYROIDISM, UNSPECIFIED: ICD-10-CM

## 2024-02-29 DIAGNOSIS — L40.59 OTHER PSORIATIC ARTHROPATHY: ICD-10-CM

## 2024-02-29 DIAGNOSIS — M05.79 RHEUMATOID ARTHRITIS WITH RHEUMATOID FACTOR OF MULTIPLE SITES WITHOUT ORGAN OR SYSTEMS INVOLVEMENT: ICD-10-CM

## 2024-02-29 DIAGNOSIS — M06.4 INFLAMMATORY POLYARTHROPATHY: ICD-10-CM

## 2024-02-29 PROCEDURE — 84445 ASSAY OF TSI GLOBULIN: CPT

## 2024-02-29 PROCEDURE — 36415 COLL VENOUS BLD VENIPUNCTURE: CPT

## 2024-02-29 PROCEDURE — 86800 THYROGLOBULIN ANTIBODY: CPT

## 2024-02-29 PROCEDURE — 82728 ASSAY OF FERRITIN: CPT

## 2024-02-29 PROCEDURE — G0452: CPT | Mod: 26

## 2024-02-29 PROCEDURE — 86480 TB TEST CELL IMMUN MEASURE: CPT

## 2024-02-29 PROCEDURE — 80074 ACUTE HEPATITIS PANEL: CPT

## 2024-02-29 PROCEDURE — 81374 HLA I TYPING 1 ANTIGEN LR: CPT

## 2024-02-29 PROCEDURE — 86140 C-REACTIVE PROTEIN: CPT

## 2024-02-29 PROCEDURE — 85652 RBC SED RATE AUTOMATED: CPT

## 2024-02-29 PROCEDURE — 86200 CCP ANTIBODY: CPT

## 2024-02-29 PROCEDURE — 84466 ASSAY OF TRANSFERRIN: CPT

## 2024-02-29 PROCEDURE — 86376 MICROSOMAL ANTIBODY EACH: CPT

## 2024-02-29 PROCEDURE — 84432 ASSAY OF THYROGLOBULIN: CPT

## 2024-02-29 PROCEDURE — 86038 ANTINUCLEAR ANTIBODIES: CPT

## 2024-02-29 PROCEDURE — 82306 VITAMIN D 25 HYDROXY: CPT

## 2024-02-29 PROCEDURE — 82746 ASSAY OF FOLIC ACID SERUM: CPT

## 2024-02-29 PROCEDURE — 82164 ANGIOTENSIN I ENZYME TEST: CPT

## 2024-02-29 PROCEDURE — 81001 URINALYSIS AUTO W/SCOPE: CPT

## 2024-02-29 PROCEDURE — 83550 IRON BINDING TEST: CPT

## 2024-02-29 PROCEDURE — 83036 HEMOGLOBIN GLYCOSYLATED A1C: CPT

## 2024-02-29 PROCEDURE — 86431 RHEUMATOID FACTOR QUANT: CPT

## 2024-02-29 PROCEDURE — 87086 URINE CULTURE/COLONY COUNT: CPT

## 2024-02-29 PROCEDURE — 83540 ASSAY OF IRON: CPT

## 2024-02-29 PROCEDURE — 84443 ASSAY THYROID STIM HORMONE: CPT

## 2024-02-29 PROCEDURE — 82955 ASSAY OF G6PD ENZYME: CPT

## 2024-02-29 PROCEDURE — 86707 HEPATITIS BE ANTIBODY: CPT

## 2024-02-29 PROCEDURE — 80053 COMPREHEN METABOLIC PANEL: CPT

## 2024-02-29 PROCEDURE — 82330 ASSAY OF CALCIUM: CPT

## 2024-02-29 PROCEDURE — 82607 VITAMIN B-12: CPT

## 2024-02-29 PROCEDURE — 85027 COMPLETE CBC AUTOMATED: CPT

## 2024-03-05 ENCOUNTER — OUTPATIENT (OUTPATIENT)
Dept: OUTPATIENT SERVICES | Facility: HOSPITAL | Age: 54
LOS: 1 days | End: 2024-03-05
Payer: COMMERCIAL

## 2024-03-05 ENCOUNTER — APPOINTMENT (OUTPATIENT)
Dept: ORTHOPEDIC SURGERY | Facility: CLINIC | Age: 54
End: 2024-03-05

## 2024-03-05 ENCOUNTER — APPOINTMENT (OUTPATIENT)
Dept: CT IMAGING | Facility: CLINIC | Age: 54
End: 2024-03-05
Payer: COMMERCIAL

## 2024-03-05 ENCOUNTER — APPOINTMENT (OUTPATIENT)
Dept: RADIOLOGY | Facility: CLINIC | Age: 54
End: 2024-03-05
Payer: COMMERCIAL

## 2024-03-05 DIAGNOSIS — N30.80 OTHER CYSTITIS WITHOUT HEMATURIA: ICD-10-CM

## 2024-03-05 DIAGNOSIS — M81.0 AGE-RELATED OSTEOPOROSIS WITHOUT CURRENT PATHOLOGICAL FRACTURE: ICD-10-CM

## 2024-03-05 DIAGNOSIS — N20.0 CALCULUS OF KIDNEY: ICD-10-CM

## 2024-03-05 DIAGNOSIS — Z00.8 ENCOUNTER FOR OTHER GENERAL EXAMINATION: ICD-10-CM

## 2024-03-05 PROCEDURE — 77080 DXA BONE DENSITY AXIAL: CPT

## 2024-03-05 PROCEDURE — 74176 CT ABD & PELVIS W/O CONTRAST: CPT | Mod: 26

## 2024-03-05 PROCEDURE — 77080 DXA BONE DENSITY AXIAL: CPT | Mod: 26

## 2024-03-05 PROCEDURE — 74176 CT ABD & PELVIS W/O CONTRAST: CPT

## 2024-04-02 ENCOUNTER — APPOINTMENT (OUTPATIENT)
Dept: ORTHOPEDIC SURGERY | Facility: CLINIC | Age: 54
End: 2024-04-02
Payer: COMMERCIAL

## 2024-04-02 VITALS — WEIGHT: 236 LBS | BODY MASS INDEX: 36.61 KG/M2 | HEIGHT: 67.5 IN

## 2024-04-02 PROCEDURE — 20610 DRAIN/INJ JOINT/BURSA W/O US: CPT | Mod: 50

## 2024-04-02 PROCEDURE — 73564 X-RAY EXAM KNEE 4 OR MORE: CPT | Mod: 50

## 2024-04-02 PROCEDURE — 99214 OFFICE O/P EST MOD 30 MIN: CPT | Mod: 25

## 2024-04-02 NOTE — PROCEDURE
[Large Joint Injection] : Large joint injection [de-identified] : Large joint injection was performed of the bilateral knees . Injection # 1    The indication for this procedure was pain and inflammation. The site was prepped with betadine and sterile technique used. Patient tolerated procedure well. Patient was advised to call if redness, pain or fever occur, apply ice for 15 minutes out of every hour for the next 12-24 hours as tolerated and patient was advised to rest the joint(s) for 1 days.    Injection:  Glesyn Series 16.8mg/ml injection     Patient has tried OTC's including aspirin, Ibuprofen, Aleve, etc or prescription NSAIDS, and/or exercises at home and/or physical therapy without satisfactory response, patient had decreased mobility in the joint and the risks benefits, and alternatives have been discussed, and verbal consent was obtained.

## 2024-04-02 NOTE — HISTORY OF PRESENT ILLNESS
[de-identified] : Date of Injury/Onset:     7 YEARS Pain: At Rest: 1/10   With Activity: 10/10 Affecting Sleep: Y Difficulty with stairs:Y Difficulty getting in and out of car:Y Sit to stand stiffness:Y Affects walking short/long distances?Y Home/Work/Recreation effected?  Y Mechanism of injury: NKI This  is not a Work-Related Injury being treated under Worker's Compensation. This  is not   an athletic injury occurring associated with an interscholastic or organized sports team. Quality of symptoms:C/O ANTERIOR LATERAL PAIN, SWELLING, INSTABILTY, CLICKING- BILATERALLY, C/O CANNOT SQUAT Improves with:   REST Worse with:   BENDING KNEE Have you been treated for this in the past?Y Have you had surgery for this in the past?N OTC Medicines:TYLENOL / ADVIL RX medicines: NO Heat, Ice, Elevation: ICE CSI or Gel Injections:  (Indicate which) GEL AND CSI - LAST INJ 6 YEARS AGO Physical Therapy/ HEP:  N Previous Treatment/Imaging/Studies Since Last Visit: KNEE SLEEVE Reports Available for Review Today: NONE- LAST XR 7 YRS AGO sHE ADMITS TO DIFFICULTY WITH STAIRS AND GETTING UP OFF THE FLOOR

## 2024-04-02 NOTE — PHYSICAL EXAM
[Left] : left knee [Right] : right knee [NL (0)] : extension 0 degrees [5___] : hamstring 5[unfilled]/5 [] : patient ambulates without assistive device [Bilateral] : knee bilaterally [All Views] : anteroposterior, lateral, skyline, and anteroposterior standing [FreeTextEntry8] : PATELLA TENDERNESS   [de-identified] : PATELLA TRACKING LATERALLY [FreeTextEntry9] : PATELLA TRACKING LATERALLY DISPLACED PATELLA FEMORAL BONE ON BONE LATERAL  CLEROTIC tricompartmental osteophytes medial and lateral joint space narrowing.  [de-identified] : DECREASED SENSATION TO THIGH [TWNoteComboBox7] : flexion 115 degrees

## 2024-04-02 NOTE — DISCUSSION/SUMMARY
[de-identified] : Patient has arthritis bilaterally patient states pain is 10/10 when walking.   Lengthy discussion regarding options was had with the patient. Nonsurgical options including but not limited to cortisone, Visco supplementation, anti-inflammatory medications (both steroidal and non-steroidal), activity modification, non-impact exercise, maintaining a healthy BMI, bracing, and icing were reviewed. Surgical options including but not limited to arthroscopy, and joint replacement were discussed as was risks, benefits and alternatives.  Following discussion of the options the plan at this time is for the patient to go forward with organized therapy. Patient was provided with a Rx for PT at this time. Patient expressed understanding the treatment plan and will begin PT as soon as they can.   Large joint gelsyn injections were preformed bilaterally to patients knees, patient will procced with PT at this time to improve pain and ROM   f/u 1 week

## 2024-04-03 ENCOUNTER — OUTPATIENT (OUTPATIENT)
Dept: OUTPATIENT SERVICES | Facility: HOSPITAL | Age: 54
LOS: 1 days | End: 2024-04-03
Payer: COMMERCIAL

## 2024-04-03 ENCOUNTER — APPOINTMENT (OUTPATIENT)
Dept: BARIATRICS/WEIGHT MGMT | Facility: CLINIC | Age: 54
End: 2024-04-03
Payer: COMMERCIAL

## 2024-04-03 PROCEDURE — G0463: CPT

## 2024-04-03 PROCEDURE — 99213 OFFICE O/P EST LOW 20 MIN: CPT | Mod: 95

## 2024-04-03 NOTE — HISTORY OF PRESENT ILLNESS
[FreeTextEntry1] : 52 yo woman with obesity (s/p duodenal switch in 2003), hx of FARSHAD, ankylosing sponylitis and rheumatoid arthritis returns for obesity medicine f/u  pre-op max was 330 lbs open DS in 2003 post-op low weight was 140-150 lbs current weight =  240 lbs  - up 4 lbs from last visit  has switched from semaglutide to tirzepatide 5mg.  has eaten more and rank more wine over the past several weeks is feeling constipated and no BM x 4 days continues Phentermine 37.5mg   would like to continue losing weight

## 2024-04-03 NOTE — REASON FOR VISIT
[Home] : at home, [unfilled] , at the time of the visit. [Other Location: e.g. Home (Enter Location, City,State)___] : at [unfilled] [Follow-Up] : a follow-up visit [Patient] : the patient [FreeTextEntry1] : Obesity

## 2024-04-04 DIAGNOSIS — I10 ESSENTIAL (PRIMARY) HYPERTENSION: ICD-10-CM

## 2024-04-08 DIAGNOSIS — E66.01 MORBID (SEVERE) OBESITY DUE TO EXCESS CALORIES: ICD-10-CM

## 2024-04-12 ENCOUNTER — APPOINTMENT (OUTPATIENT)
Dept: ORTHOPEDIC SURGERY | Facility: CLINIC | Age: 54
End: 2024-04-12
Payer: COMMERCIAL

## 2024-04-12 PROCEDURE — 20610 DRAIN/INJ JOINT/BURSA W/O US: CPT | Mod: 50

## 2024-04-12 NOTE — PROCEDURE
[Large Joint Injection] : Large joint injection [de-identified] : Large joint injection was performed of the bilateral knees . Injection # 2    The indication for this procedure was pain and inflammation. The site was prepped with betadine and sterile technique used. Patient tolerated procedure well. Patient was advised to call if redness, pain or fever occur, apply ice for 15 minutes out of every hour for the next 12-24 hours as tolerated and patient was advised to rest the joint(s) for 1 days.    Injection:  Glesyn Series 16.8mg/ml injection     Patient has tried OTC's including aspirin, Ibuprofen, Aleve, etc or prescription NSAIDS, and/or exercises at home and/or physical therapy without satisfactory response, patient had decreased mobility in the joint and the risks benefits, and alternatives have been discussed, and verbal consent was obtained.

## 2024-04-12 NOTE — PHYSICAL EXAM
[Left] : left knee [Right] : right knee [NL (0)] : extension 0 degrees [5___] : hamstring 5[unfilled]/5 [] : patient ambulates without assistive device [Bilateral] : knee bilaterally [All Views] : anteroposterior, lateral, skyline, and anteroposterior standing [FreeTextEntry8] : PATELLA TENDERNESS   [de-identified] : PATELLA TRACKING LATERALLY [de-identified] : DECREASED SENSATION TO THIGH [FreeTextEntry9] : PATELLA TRACKING LATERALLY DISPLACED PATELLA FEMORAL BONE ON BONE LATERAL  CLEROTIC tricompartmental osteophytes medial and lateral joint space narrowing.  [TWNoteComboBox7] : flexion 115 degrees

## 2024-04-19 ENCOUNTER — APPOINTMENT (OUTPATIENT)
Dept: ORTHOPEDIC SURGERY | Facility: CLINIC | Age: 54
End: 2024-04-19
Payer: COMMERCIAL

## 2024-04-19 DIAGNOSIS — M17.12 UNILATERAL PRIMARY OSTEOARTHRITIS, LEFT KNEE: ICD-10-CM

## 2024-04-19 DIAGNOSIS — M17.11 UNILATERAL PRIMARY OSTEOARTHRITIS, RIGHT KNEE: ICD-10-CM

## 2024-04-19 PROCEDURE — 99213 OFFICE O/P EST LOW 20 MIN: CPT | Mod: 25

## 2024-04-19 PROCEDURE — 20610 DRAIN/INJ JOINT/BURSA W/O US: CPT | Mod: 50

## 2024-05-01 ENCOUNTER — TRANSCRIPTION ENCOUNTER (OUTPATIENT)
Age: 54
End: 2024-05-01

## 2024-05-02 ENCOUNTER — TRANSCRIPTION ENCOUNTER (OUTPATIENT)
Age: 54
End: 2024-05-02

## 2024-05-06 RX ORDER — AMOXICILLIN 500 MG/1
500 TABLET, FILM COATED ORAL
Qty: 12 | Refills: 1 | Status: ACTIVE | COMMUNITY
Start: 2024-05-06 | End: 1900-01-01

## 2024-05-28 RX ORDER — ONDANSETRON 4 MG/1
4 TABLET ORAL
Qty: 30 | Refills: 1 | Status: ACTIVE | COMMUNITY
Start: 2023-10-27 | End: 1900-01-01

## 2024-06-04 ENCOUNTER — OUTPATIENT (OUTPATIENT)
Dept: OUTPATIENT SERVICES | Facility: HOSPITAL | Age: 54
LOS: 1 days | End: 2024-06-04
Payer: COMMERCIAL

## 2024-06-04 ENCOUNTER — APPOINTMENT (OUTPATIENT)
Dept: BARIATRICS/WEIGHT MGMT | Facility: CLINIC | Age: 54
End: 2024-06-04
Payer: COMMERCIAL

## 2024-06-04 DIAGNOSIS — E66.01 MORBID (SEVERE) OBESITY DUE TO EXCESS CALORIES: ICD-10-CM

## 2024-06-04 DIAGNOSIS — I10 ESSENTIAL (PRIMARY) HYPERTENSION: ICD-10-CM

## 2024-06-04 PROCEDURE — G0463: CPT

## 2024-06-04 PROCEDURE — 99213 OFFICE O/P EST LOW 20 MIN: CPT | Mod: 95

## 2024-06-04 RX ORDER — TIRZEPATIDE 10 MG/.5ML
10 INJECTION, SOLUTION SUBCUTANEOUS
Qty: 12 | Refills: 1 | Status: ACTIVE | COMMUNITY
Start: 2024-06-04 | End: 1900-01-01

## 2024-06-06 ENCOUNTER — NON-APPOINTMENT (OUTPATIENT)
Age: 54
End: 2024-06-06

## 2024-06-06 ENCOUNTER — APPOINTMENT (OUTPATIENT)
Dept: OPHTHALMOLOGY | Facility: CLINIC | Age: 54
End: 2024-06-06
Payer: COMMERCIAL

## 2024-06-06 PROCEDURE — 92014 COMPRE OPH EXAM EST PT 1/>: CPT

## 2024-06-07 PROBLEM — E66.01 MORBID OBESITY: Status: ACTIVE | Noted: 2020-02-14

## 2024-06-07 NOTE — ASSESSMENT
[FreeTextEntry1] : BARIATRIC SURGERY HISTORY: pre-op max was 330 lbs open DS in 2003 post-op low weight was 140-150 lbs went back up to 274 lbs current weight = 240  lbs  OBESITY COMORBIDITIES: FARSHAD - resolved after surgery, pre-dm ANTI-OBESITY MEDICATIONS: semaglutide, phentermine OBESITY MEDICATION SIDE EFFECTS:nausea, constipation   Recommend the following: whole foods based eating strategy limiting refined carbs and added fats - protein sparing c/w avoiding dairy and refined sugar tirzepatide 10mg  c/w phentermine 37.5mg increase fiber and water intake cont bariatric MVI    Return to [facility] clinic in 4-6 wks.

## 2024-06-07 NOTE — HISTORY OF PRESENT ILLNESS
[FreeTextEntry1] : 54 yo woman with obesity (s/p duodenal switch in 2003), hx of FARSHAD, ankylosing sponylitis and rheumatoid arthritis returns for obesity medicine f/u  pre-op max was 330 lbs open DS in 2003 post-op low weight was 140-150 lbs current weight =  240 lbs  -about same as previous  had no side effects with change from semaglutide to tirzepatide 5mg.   difficulty obtaining the tirzepatide consistently  remains on phentermine 37.5mg   would like to continue losing weight

## 2024-06-07 NOTE — REASON FOR VISIT
[Home] : at home, [unfilled] , at the time of the visit. [Other Location: e.g. Home (Enter Location, City,State)___] : at [unfilled] [Patient] : the patient [Follow-Up] : a follow-up visit [Medical Office: ([facility])___] : at the medical office located in  [FreeTextEntry1] : Obesity

## 2024-06-12 RX ORDER — TIRZEPATIDE 12.5 MG/.5ML
12.5 INJECTION, SOLUTION SUBCUTANEOUS
Qty: 12 | Refills: 1 | Status: ACTIVE | COMMUNITY
Start: 2024-04-03 | End: 1900-01-01

## 2024-06-18 DIAGNOSIS — E66.01 MORBID (SEVERE) OBESITY DUE TO EXCESS CALORIES: ICD-10-CM

## 2024-07-22 ENCOUNTER — NON-APPOINTMENT (OUTPATIENT)
Age: 54
End: 2024-07-22

## 2024-07-22 ENCOUNTER — APPOINTMENT (OUTPATIENT)
Dept: OPHTHALMOLOGY | Facility: CLINIC | Age: 54
End: 2024-07-22
Payer: SELF-PAY

## 2024-07-26 ENCOUNTER — APPOINTMENT (OUTPATIENT)
Dept: BARIATRICS/WEIGHT MGMT | Facility: CLINIC | Age: 54
End: 2024-07-26
Payer: COMMERCIAL

## 2024-07-26 DIAGNOSIS — E66.01 MORBID (SEVERE) OBESITY DUE TO EXCESS CALORIES: ICD-10-CM

## 2024-07-26 PROCEDURE — 99213 OFFICE O/P EST LOW 20 MIN: CPT | Mod: 95

## 2024-07-26 NOTE — HISTORY OF PRESENT ILLNESS
[FreeTextEntry1] : 52 yo woman with obesity (s/p duodenal switch in 2003), hx of FARSHAD, ankylosing sponylitis and rheumatoid arthritis returns for obesity medicine f/u  pre-op max was 330 lbs open DS in 2003 post-op low weight was 140-150 lbs current weight =  222 lbs  -feels weight loss has slowed down (108 lb weight loss total - 74 lb weight loss since beginning program)  tolerating tirzepatide 12.5 mg without side effects remains on phentermine 37.5mg a little bit more physical activity was briefly on corticosteroids - laryngitis/covid  would like to continue losing weight

## 2024-07-26 NOTE — ASSESSMENT
[FreeTextEntry1] : BARIATRIC SURGERY HISTORY: pre-op max was 330 lbs open DS in 2003 post-op low weight was 140-150 lbs went back up to 274 lbs current weight = 240  lbs  OBESITY COMORBIDITIES: FARSHAD - resolved after surgery, pre-dm ANTI-OBESITY MEDICATIONS: semaglutide, phentermine OBESITY MEDICATION SIDE EFFECTS:nausea, constipation   Recommend the following: whole foods based eating strategy limiting refined carbs and added fats - protein sparing c/w avoiding dairy and refined sugar will increase tirzepatide to 15mg for next dose c/w phentermine 37.5mg increase fiber and water intake cont bariatric MVI if ultimately needs additional medication consider adding topiramate    rtc in 6 weeks

## 2024-07-26 NOTE — REASON FOR VISIT
[Home] : at home, [unfilled] , at the time of the visit. [Medical Office: ([facility])___] : at the medical office located in  [Patient] : the patient [Follow-Up] : a follow-up visit [FreeTextEntry1] : Obesity

## 2024-08-01 ENCOUNTER — APPOINTMENT (OUTPATIENT)
Dept: OPHTHALMOLOGY | Facility: CLINIC | Age: 54
End: 2024-08-01
Payer: SELF-PAY

## 2024-08-01 ENCOUNTER — NON-APPOINTMENT (OUTPATIENT)
Age: 54
End: 2024-08-01

## 2024-08-01 PROCEDURE — 99199 UNLISTED SPECIAL SVC PX/RPRT: CPT | Mod: NC

## 2024-08-01 PROCEDURE — 92285 EXTERNAL OCULAR PHOTOGRAPHY: CPT

## 2024-08-08 ENCOUNTER — NON-APPOINTMENT (OUTPATIENT)
Age: 54
End: 2024-08-08

## 2024-08-08 ENCOUNTER — APPOINTMENT (OUTPATIENT)
Dept: OPHTHALMOLOGY | Facility: CLINIC | Age: 54
End: 2024-08-08

## 2024-08-08 PROCEDURE — 92014 COMPRE OPH EXAM EST PT 1/>: CPT

## 2024-08-27 ENCOUNTER — TRANSCRIPTION ENCOUNTER (OUTPATIENT)
Age: 54
End: 2024-08-27

## 2024-08-28 ENCOUNTER — TRANSCRIPTION ENCOUNTER (OUTPATIENT)
Age: 54
End: 2024-08-28

## 2024-08-30 ENCOUNTER — APPOINTMENT (OUTPATIENT)
Dept: ORTHOPEDIC SURGERY | Facility: CLINIC | Age: 54
End: 2024-08-30
Payer: COMMERCIAL

## 2024-08-30 DIAGNOSIS — M17.12 UNILATERAL PRIMARY OSTEOARTHRITIS, LEFT KNEE: ICD-10-CM

## 2024-08-30 DIAGNOSIS — M17.11 UNILATERAL PRIMARY OSTEOARTHRITIS, RIGHT KNEE: ICD-10-CM

## 2024-08-30 PROCEDURE — 20610 DRAIN/INJ JOINT/BURSA W/O US: CPT | Mod: 50

## 2024-08-30 NOTE — PROCEDURE
[Large Joint Injection] : Large joint injection [Bilateral] : bilaterally of the [Knee] : knee [Pain] : pain [Inflammation] : inflammation [X-ray evidence of Osteoarthritis on this or prior visit] : x-ray evidence of Osteoarthritis on this or prior visit [Betadine] : betadine [Ethyl Chloride sprayed topically] : ethyl chloride sprayed topically [Sterile technique used] : sterile technique used [___ cc    32 units 5mg] : Zilretta ~Vcc of 32 units 5 mg  [Call if redness, pain or fever occur] : call if redness, pain or fever occur [Apply ice for 15min out of every hour for the next 12-24 hours as tolerated] : apply ice for 15 minutes out of every hour for the next 12-24 hours as tolerated [Previous OTC use and PT nontherapeutic] : patient has tried OTC's including aspirin, Ibuprofen, Aleve, etc or prescription NSAIDS, and/or exercises at home and/or physical therapy without satisfactory response [Patient had decreased mobility in the joint] : patient had decreased mobility in the joint [Risks, benefits, alternatives discussed / Verbal consent obtained] : the risks benefits, and alternatives have been discussed, and verbal consent was obtained [FreeTextEntry3] : I recommended an intra-articular steroid injection at this time.  This is an excellent treatment when NSAIDs do not completely control arthritis in a joint(s), a joint is becoming contracted or is particularly painful.    This is a simple, local treatment that directs a long acting steroid into the joint, directly where it is needed.  I explained that there is minimal systemic absorption of the steroid and therefore rarely systemic side effects of steroids (if many joints are injected, occasionally children can experience flushing).  The effect of the injection generally begins within 24 hours and lasts on average for 3 months.  After that we would need to monitor carefully.  Sometimes the arthritis does not return to that joint, other times it can return when the effects of the injection wear off.  The only potential side effects of joint injections are skin pallor around the injection site and subcutaneous atrophy if the steroid leaks out from the joint.  This occurs mainly if the child moves the joint frequently within the first 24 hours.  Therefore, we urge joint rest during the first 24 hours (i.e. minimal walking if a knee or ankle is injected).  The other potential side effect is the introduction of infection which is minimized by using standard sterile technique.  The family understood the benefits and risks and agree to schedule the procedure.

## 2024-08-30 NOTE — PHYSICAL EXAM
[Left] : left knee [Right] : right knee [5___] : hamstring 5[unfilled]/5 [] : patient ambulates without assistive device [TWNoteComboBox7] : flexion 120 degrees [de-identified] : extension -5 degrees

## 2024-09-12 ENCOUNTER — APPOINTMENT (OUTPATIENT)
Dept: ORTHOPEDIC SURGERY | Facility: CLINIC | Age: 54
End: 2024-09-12
Payer: COMMERCIAL

## 2024-09-12 VITALS — BODY MASS INDEX: 34.13 KG/M2 | WEIGHT: 220 LBS | HEIGHT: 67.5 IN

## 2024-09-12 DIAGNOSIS — M17.11 UNILATERAL PRIMARY OSTEOARTHRITIS, RIGHT KNEE: ICD-10-CM

## 2024-09-12 DIAGNOSIS — M17.12 UNILATERAL PRIMARY OSTEOARTHRITIS, LEFT KNEE: ICD-10-CM

## 2024-09-12 PROCEDURE — 99214 OFFICE O/P EST MOD 30 MIN: CPT

## 2024-09-12 NOTE — DISCUSSION/SUMMARY
[de-identified] : Lengthy discussion regarding options was had with the patient. Nonsurgical options including but not limited to cortisone, Visco supplementation, Prescription anti-inflammatory medications (both steroidal and non-steroidal), activity modification, non-impact exercise, maintaining a healthy BMI, bracing, and icing were reviewed. Surgical options including but not limited to arthroscopy, and joint replacement were discussed as was risks, benefits and alternatives of all the proposed treatments. Discussed also with the patient that they could also delay any immediate treatment options, and continue to observe and self care for the discussed problem. Discussed HEP as well as Rest, Ice and elevation. Patient had ample time to ask any questions about todays visit and the diagnosis, and all questions were answered. Patient understands the plan going forward.  The patient was advised of the diagnosis. The natural history of the pathology was explained in full to the patient in layman's terms. Several different treatment options were discussed and explained in full to the patient including the risks and benefits of both surgical and non-surgical treatments. All questions and concerns were answered.  The patients had a series of Visco supplementation in bilateral knees in 04/2024 with little relief.  The patient reports she had bilateral knee CSI/zilretta on 08/30/2024 with NO relief and her knee pain increased.  The patients right knee pain is worse than the left at this time.  Patient has failed conservative non-surgical treatment Prescription anti-inflammatory medications and PT is contraindicated at this time. PT is non indicated due to his deformity, due to pain limiting exercise and daily activities.  As the patient has bone on bone OA on prior XR and her ADLs are severely limited, we discussed a RTKA.  I spent time going in detail the problem and the associated risks/benefits of surgery. Went into detailed discussion of complications including but not limited to, nerve injury, non-union, repeat fracture, DVT /PE /pe postop, instability, transfusion, infection, NVA injury, stiffness, leg length discrepancy, inability to ambulate & death. Discussed implant and model shown to patient. Patient had ample time to ask any questions, and at this time answered all patient questions, should anymore arise they were instructed to follow up. Patient expressed understanding of the proposed procedure and postop directions.   The plan is to move forward with a RIGHT KNEE TOTAL KNEE ARTHROPLASTY in December (AT LEAST 3 MONTHS FROM 08/30/2024)

## 2024-09-12 NOTE — REASON FOR VISIT
[FreeTextEntry2] : here for f/u bilateral knee pain and swelling.  states right knee is worse than left.  c/o right knee is unstable and clicking more.  had bilateral knee CSI/zilretta 08/30/2024 got worse not better.  c/o can't exercise d/t pain.  patient is using zepbound to lose weight, has lost 85# so far.

## 2024-09-12 NOTE — PHYSICAL EXAM
[Left] : left knee [Right] : right knee [5___] : hamstring 5[unfilled]/5 [] : no calf tenderness [TWNoteComboBox7] : flexion 120 degrees [de-identified] : extension -5 degrees

## 2024-09-18 ENCOUNTER — APPOINTMENT (OUTPATIENT)
Dept: BARIATRICS/WEIGHT MGMT | Facility: CLINIC | Age: 54
End: 2024-09-18
Payer: COMMERCIAL

## 2024-09-18 DIAGNOSIS — E66.01 MORBID (SEVERE) OBESITY DUE TO EXCESS CALORIES: ICD-10-CM

## 2024-09-18 PROCEDURE — 99213 OFFICE O/P EST LOW 20 MIN: CPT | Mod: 95

## 2024-09-18 PROCEDURE — G2211 COMPLEX E/M VISIT ADD ON: CPT | Mod: 95

## 2024-09-18 RX ORDER — PHENTERMINE HYDROCHLORIDE 37.5 MG/1
37.5 TABLET ORAL
Qty: 30 | Refills: 0 | Status: ACTIVE | COMMUNITY
Start: 2024-09-18 | End: 1900-01-01

## 2024-09-18 NOTE — HISTORY OF PRESENT ILLNESS
[FreeTextEntry1] : 54 yo woman with obesity (s/p duodenal switch in 2003), hx of FARSHAD, ankylosing sponylitis and rheumatoid arthritis returns for obesity medicine f/u  pre-op max was 330 lbs open DS in 2003 post-op low weight was 140-150 lbs current weight =  219  lbs  -feels weight loss has slowed down (111 lb weight loss total - 74 lb weight loss since beginning program)  knee replacement 12/4 got R knee cortisone shot  tolerating tirzepatide 12.5 mg without side effects remains on phentermine 37.5mg a little bit more physical activity was briefly on corticosteroids - laryngitis/covid  would like to continue losing weight

## 2024-09-18 NOTE — REASON FOR VISIT
[Home] : at home, [unfilled] , at the time of the visit. [Medical Office: ([facility])___] : at the medical office located in  [Patient] : the patient [Follow-Up] : a follow-up visit [FreeTextEntry1] : Obesity

## 2024-09-18 NOTE — REASON FOR VISIT
[Home] : at home, [unfilled] , at the time of the visit. [Medical Office: ([facility])___] : at the medical office located in  [Patient] : the patient [Follow-Up] : a follow-up visit [FreeTextEntry1] : Obesity

## 2024-09-24 ENCOUNTER — TRANSCRIPTION ENCOUNTER (OUTPATIENT)
Age: 54
End: 2024-09-24

## 2024-11-04 ENCOUNTER — NON-APPOINTMENT (OUTPATIENT)
Age: 54
End: 2024-11-04

## 2024-11-08 ENCOUNTER — APPOINTMENT (OUTPATIENT)
Dept: PLASTIC SURGERY | Facility: CLINIC | Age: 54
End: 2024-11-08

## 2024-11-08 VITALS
WEIGHT: 220 LBS | TEMPERATURE: 97.7 F | SYSTOLIC BLOOD PRESSURE: 130 MMHG | OXYGEN SATURATION: 99 % | HEIGHT: 67 IN | HEART RATE: 89 BPM | BODY MASS INDEX: 34.53 KG/M2 | DIASTOLIC BLOOD PRESSURE: 83 MMHG

## 2024-11-08 DIAGNOSIS — R63.4 ABNORMAL WEIGHT LOSS: ICD-10-CM

## 2024-11-08 DIAGNOSIS — E65 LOCALIZED ADIPOSITY: ICD-10-CM

## 2024-11-08 DIAGNOSIS — N64.81 PTOSIS OF BREAST: ICD-10-CM

## 2024-11-08 PROCEDURE — 99213 OFFICE O/P EST LOW 20 MIN: CPT

## 2024-11-11 PROBLEM — R63.4 WEIGHT LOSS: Status: ACTIVE | Noted: 2024-11-11

## 2024-11-11 PROBLEM — E65 ABDOMINAL PANNUS: Status: ACTIVE | Noted: 2024-11-11

## 2024-11-11 PROBLEM — N64.81 BREAST PTOSIS: Status: ACTIVE | Noted: 2024-11-11

## 2024-11-13 ENCOUNTER — OUTPATIENT (OUTPATIENT)
Dept: OUTPATIENT SERVICES | Facility: HOSPITAL | Age: 54
LOS: 1 days | End: 2024-11-13
Payer: COMMERCIAL

## 2024-11-13 ENCOUNTER — APPOINTMENT (OUTPATIENT)
Dept: BARIATRICS/WEIGHT MGMT | Facility: CLINIC | Age: 54
End: 2024-11-13
Payer: COMMERCIAL

## 2024-11-13 DIAGNOSIS — E66.01 MORBID (SEVERE) OBESITY DUE TO EXCESS CALORIES: ICD-10-CM

## 2024-11-13 PROCEDURE — 99213 OFFICE O/P EST LOW 20 MIN: CPT | Mod: 95

## 2024-11-13 PROCEDURE — G0463: CPT

## 2024-11-13 PROCEDURE — G2211 COMPLEX E/M VISIT ADD ON: CPT | Mod: 95

## 2024-11-14 DIAGNOSIS — I10 ESSENTIAL (PRIMARY) HYPERTENSION: ICD-10-CM

## 2024-11-19 ENCOUNTER — NON-APPOINTMENT (OUTPATIENT)
Age: 54
End: 2024-11-19

## 2024-11-20 ENCOUNTER — APPOINTMENT (OUTPATIENT)
Dept: CT IMAGING | Facility: CLINIC | Age: 54
End: 2024-11-20
Payer: COMMERCIAL

## 2024-11-20 ENCOUNTER — OUTPATIENT (OUTPATIENT)
Dept: OUTPATIENT SERVICES | Facility: HOSPITAL | Age: 54
LOS: 1 days | End: 2024-11-20
Payer: COMMERCIAL

## 2024-11-20 DIAGNOSIS — Z01.818 ENCOUNTER FOR OTHER PREPROCEDURAL EXAMINATION: ICD-10-CM

## 2024-11-20 PROCEDURE — 75571 CT HRT W/O DYE W/CA TEST: CPT

## 2024-11-20 PROCEDURE — 75571 CT HRT W/O DYE W/CA TEST: CPT | Mod: 26

## 2024-11-26 DIAGNOSIS — E66.01 MORBID (SEVERE) OBESITY DUE TO EXCESS CALORIES: ICD-10-CM

## 2024-12-04 ENCOUNTER — APPOINTMENT (OUTPATIENT)
Dept: ORTHOPEDIC SURGERY | Facility: HOSPITAL | Age: 54
End: 2024-12-04
Payer: COMMERCIAL

## 2024-12-04 PROCEDURE — 27447 TOTAL KNEE ARTHROPLASTY: CPT | Mod: RT

## 2024-12-04 PROCEDURE — 20985 CPTR-ASST DIR MS PX: CPT

## 2024-12-04 PROCEDURE — 20985 CPTR-ASST DIR MS PX: CPT | Mod: AS

## 2024-12-04 PROCEDURE — 27447 TOTAL KNEE ARTHROPLASTY: CPT | Mod: AS,RT

## 2024-12-20 ENCOUNTER — APPOINTMENT (OUTPATIENT)
Dept: ORTHOPEDIC SURGERY | Facility: CLINIC | Age: 54
End: 2024-12-20
Payer: COMMERCIAL

## 2024-12-20 DIAGNOSIS — Z96.651 PRESENCE OF RIGHT ARTIFICIAL KNEE JOINT: ICD-10-CM

## 2024-12-20 PROCEDURE — 99024 POSTOP FOLLOW-UP VISIT: CPT

## 2025-01-02 ENCOUNTER — APPOINTMENT (OUTPATIENT)
Dept: DERMATOLOGY | Facility: CLINIC | Age: 55
End: 2025-01-02

## 2025-01-21 ENCOUNTER — APPOINTMENT (OUTPATIENT)
Dept: BARIATRICS/WEIGHT MGMT | Facility: CLINIC | Age: 55
End: 2025-01-21
Payer: COMMERCIAL

## 2025-01-21 ENCOUNTER — APPOINTMENT (OUTPATIENT)
Dept: ORTHOPEDIC SURGERY | Facility: CLINIC | Age: 55
End: 2025-01-21
Payer: COMMERCIAL

## 2025-01-21 ENCOUNTER — OUTPATIENT (OUTPATIENT)
Dept: OUTPATIENT SERVICES | Facility: HOSPITAL | Age: 55
LOS: 1 days | End: 2025-01-21

## 2025-01-21 DIAGNOSIS — Z96.651 PRESENCE OF RIGHT ARTIFICIAL KNEE JOINT: ICD-10-CM

## 2025-01-21 DIAGNOSIS — I10 ESSENTIAL (PRIMARY) HYPERTENSION: ICD-10-CM

## 2025-01-21 DIAGNOSIS — E66.01 MORBID (SEVERE) OBESITY DUE TO EXCESS CALORIES: ICD-10-CM

## 2025-01-21 PROCEDURE — 99024 POSTOP FOLLOW-UP VISIT: CPT

## 2025-01-21 PROCEDURE — 99213 OFFICE O/P EST LOW 20 MIN: CPT | Mod: 95

## 2025-01-21 PROCEDURE — 73562 X-RAY EXAM OF KNEE 3: CPT | Mod: RT

## 2025-01-23 ENCOUNTER — TRANSCRIPTION ENCOUNTER (OUTPATIENT)
Age: 55
End: 2025-01-23

## 2025-01-28 ENCOUNTER — NON-APPOINTMENT (OUTPATIENT)
Age: 55
End: 2025-01-28

## 2025-03-04 ENCOUNTER — APPOINTMENT (OUTPATIENT)
Dept: ORTHOPEDIC SURGERY | Facility: CLINIC | Age: 55
End: 2025-03-04
Payer: COMMERCIAL

## 2025-03-04 DIAGNOSIS — Z96.651 PRESENCE OF RIGHT ARTIFICIAL KNEE JOINT: ICD-10-CM

## 2025-03-04 PROCEDURE — 99024 POSTOP FOLLOW-UP VISIT: CPT

## 2025-03-04 PROCEDURE — 73562 X-RAY EXAM OF KNEE 3: CPT | Mod: RT

## 2025-03-04 PROCEDURE — 73564 X-RAY EXAM KNEE 4 OR MORE: CPT | Mod: LT

## 2025-03-12 ENCOUNTER — OUTPATIENT (OUTPATIENT)
Dept: OUTPATIENT SERVICES | Facility: HOSPITAL | Age: 55
LOS: 1 days | End: 2025-03-12

## 2025-03-12 ENCOUNTER — APPOINTMENT (OUTPATIENT)
Dept: BARIATRICS/WEIGHT MGMT | Facility: CLINIC | Age: 55
End: 2025-03-12
Payer: COMMERCIAL

## 2025-03-12 DIAGNOSIS — E66.01 MORBID (SEVERE) OBESITY DUE TO EXCESS CALORIES: ICD-10-CM

## 2025-03-12 PROCEDURE — 99213 OFFICE O/P EST LOW 20 MIN: CPT | Mod: 95

## 2025-03-13 DIAGNOSIS — I10 ESSENTIAL (PRIMARY) HYPERTENSION: ICD-10-CM

## 2025-03-31 ENCOUNTER — RESULT CHARGE (OUTPATIENT)
Age: 55
End: 2025-03-31

## 2025-03-31 ENCOUNTER — APPOINTMENT (OUTPATIENT)
Dept: ORTHOPEDIC SURGERY | Facility: CLINIC | Age: 55
End: 2025-03-31
Payer: COMMERCIAL

## 2025-03-31 ENCOUNTER — RX RENEWAL (OUTPATIENT)
Age: 55
End: 2025-03-31

## 2025-03-31 VITALS — HEIGHT: 67 IN | BODY MASS INDEX: 31.08 KG/M2 | WEIGHT: 198 LBS

## 2025-03-31 DIAGNOSIS — Z96.651 PRESENCE OF RIGHT ARTIFICIAL KNEE JOINT: ICD-10-CM

## 2025-03-31 PROCEDURE — 99214 OFFICE O/P EST MOD 30 MIN: CPT

## 2025-03-31 PROCEDURE — 73562 X-RAY EXAM OF KNEE 3: CPT | Mod: RT

## 2025-04-01 RX ORDER — MELOXICAM 15 MG/1
15 TABLET ORAL DAILY
Qty: 30 | Refills: 0 | Status: ACTIVE | COMMUNITY
Start: 2025-04-01 | End: 1900-01-01

## 2025-04-25 ENCOUNTER — APPOINTMENT (OUTPATIENT)
Dept: PLASTIC SURGERY | Facility: CLINIC | Age: 55
End: 2025-04-25

## 2025-05-07 ENCOUNTER — NON-APPOINTMENT (OUTPATIENT)
Age: 55
End: 2025-05-07

## 2025-05-07 ENCOUNTER — APPOINTMENT (OUTPATIENT)
Dept: BARIATRICS/WEIGHT MGMT | Facility: CLINIC | Age: 55
End: 2025-05-07
Payer: COMMERCIAL

## 2025-05-07 ENCOUNTER — OUTPATIENT (OUTPATIENT)
Dept: OUTPATIENT SERVICES | Facility: HOSPITAL | Age: 55
LOS: 1 days | End: 2025-05-07

## 2025-05-07 DIAGNOSIS — E66.01 MORBID (SEVERE) OBESITY DUE TO EXCESS CALORIES: ICD-10-CM

## 2025-05-07 PROCEDURE — 99213 OFFICE O/P EST LOW 20 MIN: CPT | Mod: 95

## 2025-05-07 PROCEDURE — G2211 COMPLEX E/M VISIT ADD ON: CPT | Mod: 95

## 2025-05-08 DIAGNOSIS — I10 ESSENTIAL (PRIMARY) HYPERTENSION: ICD-10-CM

## 2025-05-22 ENCOUNTER — APPOINTMENT (OUTPATIENT)
Dept: PLASTIC SURGERY | Facility: HOSPITAL | Age: 55
End: 2025-05-22

## 2025-05-28 ENCOUNTER — APPOINTMENT (OUTPATIENT)
Dept: ORTHOPEDIC SURGERY | Facility: HOSPITAL | Age: 55
End: 2025-05-28
Payer: COMMERCIAL

## 2025-05-28 PROCEDURE — 27447 TOTAL KNEE ARTHROPLASTY: CPT | Mod: LT

## 2025-05-28 PROCEDURE — 20985 CPTR-ASST DIR MS PX: CPT

## 2025-05-28 PROCEDURE — 27447 TOTAL KNEE ARTHROPLASTY: CPT | Mod: AS,LT

## 2025-05-30 RX ORDER — ONDANSETRON 4 MG/1
4 TABLET, ORALLY DISINTEGRATING ORAL EVERY 6 HOURS
Qty: 30 | Refills: 0 | Status: ACTIVE | COMMUNITY
Start: 2025-05-30 | End: 1900-01-01

## 2025-05-30 RX ORDER — HYDROCODONE BITARTRATE AND ACETAMINOPHEN 5; 325 MG/1; MG/1
5-325 TABLET ORAL
Qty: 56 | Refills: 0 | Status: ACTIVE | COMMUNITY
Start: 2025-05-30 | End: 1900-01-01

## 2025-05-31 NOTE — ASSESSMENT
Encounter Date: 2025       History     Chief Complaint   Patient presents with    Laceration     Left leg laceration. Pt was cutting foam board on her lap and the knife cut through to her leg. Bandage in place. States whenever she removes bandage wound starts spurting blood.      50-year-old female who presents with complaints of laceration over distal anterior medial left thigh that occurred at 2:30 p.m. today.  She was cutting a foam board on her lap, and the  that she was using slipped and cut into her thigh.  Reports bleeding persisted so she presented to the ED. Denies any significant pain over the area.  Denies numbness, tingling, weakness, significant blood loss, dizziness, any other wounds, any other areas of injury, any other symptoms.    The history is provided by the patient.     Review of patient's allergies indicates:   Allergen Reactions    Zolpidem Other (See Comments)     Hallucinations     Past Medical History:   Diagnosis Date    ADHD     Anxiety     Bipolar 2 disorder     Hyperlipidemia     Pneumonia     Suicide attempt 2018    benzo overdose    Thyroid disorder      Past Surgical History:   Procedure Laterality Date     SECTION      CHOLECYSTECTOMY      ENDOMETRIAL ABLATION      TUBAL LIGATION       Family History   Problem Relation Name Age of Onset    Lung cancer Mother       Social History[1]  Review of Systems   Constitutional:  Negative for fever.   HENT:  Negative for sore throat.    Respiratory:  Negative for shortness of breath.    Cardiovascular:  Negative for chest pain.   Gastrointestinal:  Negative for nausea.   Genitourinary:  Negative for dysuria.   Musculoskeletal:  Negative for back pain.   Skin:  Positive for wound. Negative for rash.   Neurological:  Negative for weakness.   Hematological:  Does not bruise/bleed easily.       Physical Exam     Initial Vitals [25 1819]   BP Pulse Resp Temp SpO2   (!) 140/70 98 16 98.4 °F (36.9 °C) 99 %      MAP   [FreeTextEntry1] : BARIATRIC SURGERY HISTORY: pre-op max was 330 lbs open DS in 2003 post-op low weight was 140-150 lbs went back up to 274 lbs current weight = 236  lbs  OBESITY COMORBIDITIES: FARSHAD - resolved after surgery, pre-dm ANTI-OBESITY MEDICATIONS: semaglutide, phentermine OBESITY MEDICATION SIDE EFFECTS:nausea, constipation   Recommend the following: whole foods based eating strategy limiting refined carbs and added fats - protein sparing c/w avoiding dairy and refined sugar we discussed medication options and will increase tirzepatide.  due to shortages only option might be 12.5mg (can take zofran if nausea during transition) c/w phentermine 37.5mg increase fiber and water intake cont bariatric MVI    Return to [facility] in 4-6 wks.      --         Physical Exam    Nursing note and vitals reviewed.  Constitutional: She appears well-developed and well-nourished.  Non-toxic appearance. She does not have a sickly appearance. She does not appear ill. No distress.   HENT:   Head: Normocephalic and atraumatic.   Right Ear: Hearing normal.   Left Ear: Hearing normal.   Nose: Nose normal. Mouth/Throat: Uvula is midline and oropharynx is clear and moist.   Eyes: Conjunctivae, EOM and lids are normal. Pupils are equal, round, and reactive to light.   Neck: Trachea normal. Neck supple.   Normal range of motion.   Full passive range of motion without pain.     Cardiovascular:  Normal rate, regular rhythm, normal heart sounds, intact distal pulses and normal pulses.           Pulmonary/Chest: Effort normal and breath sounds normal. No respiratory distress. She has no wheezes.   Abdominal: Abdomen is soft. Bowel sounds are normal. There is no abdominal tenderness. There is no rebound and no guarding.   Musculoskeletal:         General: Normal range of motion.      Cervical back: Normal, full passive range of motion without pain, normal range of motion and neck supple.     Neurological: She is alert and oriented to person, place, and time. She has normal strength and normal reflexes. No cranial nerve deficit or sensory deficit. GCS score is 15. GCS eye subscore is 4. GCS verbal subscore is 5. GCS motor subscore is 6.   Skin: Skin is warm and dry. Capillary refill takes less than 2 seconds. Laceration noted.   3 linear superficial lacerations over distal/anterior/medial left thigh. Superior lac 1.5 inch in length, middle lac 0.5 inch in length, inferior lac 0.75 inch in length. Bleeding well controlled. Nontender, clean, no foreign bodies.   Full strength grossly, neurovascular status grossly intact.    Psychiatric: She has a normal mood and affect. Her behavior is normal. Thought content normal.         ED Course   Lac Repair    Date/Time: 5/31/2025 7:27  PM    Performed by: Rajinder Ribeiro PA-C  Authorized by: Amada Hough DO    Consent:     Consent obtained:  Verbal    Consent given by:  Patient    Risks, benefits, and alternatives were discussed: yes    Universal protocol:     Patient identity confirmed:  Verbally with patient  Anesthesia:     Anesthesia method:  Local infiltration    Local anesthetic:  Lidocaine 1% w/o epi  Laceration details:     Location:  Leg    Leg location:  L upper leg (Distal/anterior/medial thigh.)    Wound length (cm): superior lac 1.5 inch, middle lac 0.5 inch, inferior lac 0.75 inch.  Pre-procedure details:     Preparation:  Patient was prepped and draped in usual sterile fashion  Exploration:     Hemostasis achieved with:  Direct pressure    Wound exploration: wound explored through full range of motion and entire depth of wound visualized      Wound extent: no foreign bodies/material noted, no nerve damage noted, no tendon damage noted and no vascular damage noted      Contaminated: no    Treatment:     Area cleansed with:  Povidone-iodine and saline    Amount of cleaning:  Standard    Irrigation solution:  Sterile saline    Irrigation volume:  250    Irrigation method:  Syringe    Debridement:  None  Skin repair:     Repair method:  Sutures    Suture size:  4-0    Suture material:  Prolene    Suture technique:  Simple interrupted    Number of sutures: superior lac 8, middle lac 2, inferior lac 3.  Approximation:     Approximation:  Close  Repair type:     Repair type:  Simple  Post-procedure details:     Dressing:  Antibiotic ointment and non-adherent dressing    Procedure completion:  Tolerated well, no immediate complications    Labs Reviewed   HEPATITIS C ANTIBODY   HEP C VIRUS HOLD SPECIMEN   HIV 1 / 2 ANTIBODY          Imaging Results    None          Medications   Td (Tenivac) IM vaccine (>/= 6 yo) (0.5 mLs Intramuscular Given 5/31/25 1837)   LIDOcaine (PF) 10 mg/ml (1%) injection 100 mg (100 mg Infiltration Given by  Other 5/31/25 1839)   mupirocin 2 % ointment ( Topical (Top) Given by Other 5/31/25 1930)     Medical Decision Making  Tetanus updated today   Wound repaired without issue   ER precautions discussed   All questions answered    Risk  Prescription drug management.                                      Clinical Impression:  Final diagnoses:  [X26.633W] Leg laceration, left, initial encounter (Primary)          ED Disposition Condition    Discharge Stable          ED Prescriptions    None       Follow-up Information       Follow up With Specialties Details Why Contact Info    [facility] - Emergency Dept. Emergency Medicine  If symptoms worsen [id number] [facility] [address] [phone]                   [1]   Social History  Tobacco Use    Smoking status: Every Day    Smokeless tobacco: Never   Substance Use Topics    Alcohol use: Not Currently    Drug use: Not Currently     Types: Benzodiazepines        Rajinder Ribeiro PA-C  05/31/25 1950

## 2025-06-12 RX ORDER — HYDROCODONE BITARTRATE AND ACETAMINOPHEN 5; 325 MG/1; MG/1
5-325 TABLET ORAL
Qty: 42 | Refills: 0 | Status: ACTIVE | COMMUNITY
Start: 2025-06-12 | End: 1900-01-01

## 2025-06-13 ENCOUNTER — APPOINTMENT (OUTPATIENT)
Dept: ORTHOPEDIC SURGERY | Facility: CLINIC | Age: 55
End: 2025-06-13
Payer: COMMERCIAL

## 2025-06-13 PROCEDURE — 99024 POSTOP FOLLOW-UP VISIT: CPT

## 2025-07-07 ENCOUNTER — RX RENEWAL (OUTPATIENT)
Age: 55
End: 2025-07-07

## 2025-07-11 ENCOUNTER — APPOINTMENT (OUTPATIENT)
Dept: BARIATRICS/WEIGHT MGMT | Facility: CLINIC | Age: 55
End: 2025-07-11
Payer: COMMERCIAL

## 2025-07-11 ENCOUNTER — OUTPATIENT (OUTPATIENT)
Dept: OUTPATIENT SERVICES | Facility: HOSPITAL | Age: 55
LOS: 1 days | End: 2025-07-11

## 2025-07-11 DIAGNOSIS — I10 ESSENTIAL (PRIMARY) HYPERTENSION: ICD-10-CM

## 2025-07-11 PROCEDURE — G2211 COMPLEX E/M VISIT ADD ON: CPT | Mod: 95

## 2025-07-11 PROCEDURE — 99213 OFFICE O/P EST LOW 20 MIN: CPT | Mod: 95

## 2025-07-15 ENCOUNTER — APPOINTMENT (OUTPATIENT)
Dept: ORTHOPEDIC SURGERY | Facility: CLINIC | Age: 55
End: 2025-07-15
Payer: COMMERCIAL

## 2025-07-15 PROBLEM — Z96.652 STATUS POST LEFT KNEE REPLACEMENT: Status: ACTIVE | Noted: 2025-06-13

## 2025-07-15 PROCEDURE — 99024 POSTOP FOLLOW-UP VISIT: CPT

## 2025-07-15 PROCEDURE — 73562 X-RAY EXAM OF KNEE 3: CPT | Mod: LT

## 2025-08-15 ENCOUNTER — APPOINTMENT (OUTPATIENT)
Dept: OTOLARYNGOLOGY | Facility: CLINIC | Age: 55
End: 2025-08-15
Payer: COMMERCIAL

## 2025-08-15 VITALS
DIASTOLIC BLOOD PRESSURE: 77 MMHG | HEIGHT: 67 IN | HEART RATE: 69 BPM | BODY MASS INDEX: 29.03 KG/M2 | SYSTOLIC BLOOD PRESSURE: 115 MMHG | WEIGHT: 185 LBS

## 2025-08-15 DIAGNOSIS — R07.0 PAIN IN THROAT: ICD-10-CM

## 2025-08-15 PROCEDURE — 99203 OFFICE O/P NEW LOW 30 MIN: CPT | Mod: 25

## 2025-08-15 PROCEDURE — 31575 DIAGNOSTIC LARYNGOSCOPY: CPT

## 2025-08-22 ENCOUNTER — APPOINTMENT (OUTPATIENT)
Dept: PLASTIC SURGERY | Facility: CLINIC | Age: 55
End: 2025-08-22

## 2025-08-22 DIAGNOSIS — E65 LOCALIZED ADIPOSITY: ICD-10-CM

## 2025-08-22 DIAGNOSIS — R63.4 ABNORMAL WEIGHT LOSS: ICD-10-CM

## 2025-08-22 PROCEDURE — 99215 OFFICE O/P EST HI 40 MIN: CPT

## 2025-08-25 ENCOUNTER — RESULT REVIEW (OUTPATIENT)
Age: 55
End: 2025-08-25

## 2025-08-25 ENCOUNTER — APPOINTMENT (OUTPATIENT)
Dept: DERMATOLOGY | Facility: CLINIC | Age: 55
End: 2025-08-25
Payer: COMMERCIAL

## 2025-08-25 PROCEDURE — 11104 PUNCH BX SKIN SINGLE LESION: CPT

## 2025-08-25 PROCEDURE — 99213 OFFICE O/P EST LOW 20 MIN: CPT | Mod: 25

## 2025-08-28 ENCOUNTER — APPOINTMENT (OUTPATIENT)
Dept: CT IMAGING | Facility: CLINIC | Age: 55
End: 2025-08-28
Payer: COMMERCIAL

## 2025-08-28 ENCOUNTER — OUTPATIENT (OUTPATIENT)
Dept: OUTPATIENT SERVICES | Facility: HOSPITAL | Age: 55
LOS: 1 days | End: 2025-08-28
Payer: COMMERCIAL

## 2025-08-28 DIAGNOSIS — R63.4 ABNORMAL WEIGHT LOSS: ICD-10-CM

## 2025-08-28 DIAGNOSIS — Z00.8 ENCOUNTER FOR OTHER GENERAL EXAMINATION: ICD-10-CM

## 2025-08-28 DIAGNOSIS — E65 LOCALIZED ADIPOSITY: ICD-10-CM

## 2025-08-28 PROCEDURE — 74176 CT ABD & PELVIS W/O CONTRAST: CPT | Mod: 26

## 2025-08-28 PROCEDURE — 74176 CT ABD & PELVIS W/O CONTRAST: CPT

## 2025-09-03 ENCOUNTER — APPOINTMENT (OUTPATIENT)
Dept: BARIATRICS/WEIGHT MGMT | Facility: CLINIC | Age: 55
End: 2025-09-03
Payer: COMMERCIAL

## 2025-09-03 ENCOUNTER — OUTPATIENT (OUTPATIENT)
Dept: OUTPATIENT SERVICES | Facility: HOSPITAL | Age: 55
LOS: 1 days | End: 2025-09-03

## 2025-09-03 DIAGNOSIS — E66.01 MORBID (SEVERE) OBESITY DUE TO EXCESS CALORIES: ICD-10-CM

## 2025-09-03 PROCEDURE — G2211 COMPLEX E/M VISIT ADD ON: CPT | Mod: 95

## 2025-09-03 PROCEDURE — 99213 OFFICE O/P EST LOW 20 MIN: CPT | Mod: 95

## 2025-09-04 DIAGNOSIS — I10 ESSENTIAL (PRIMARY) HYPERTENSION: ICD-10-CM

## 2025-09-16 ENCOUNTER — APPOINTMENT (OUTPATIENT)
Dept: SURGERY | Facility: CLINIC | Age: 55
End: 2025-09-16
Payer: COMMERCIAL

## 2025-09-16 VITALS
RESPIRATION RATE: 17 BRPM | SYSTOLIC BLOOD PRESSURE: 114 MMHG | WEIGHT: 183 LBS | BODY MASS INDEX: 28.72 KG/M2 | DIASTOLIC BLOOD PRESSURE: 70 MMHG | HEIGHT: 67 IN

## 2025-09-16 DIAGNOSIS — K43.2 INCISIONAL HERNIA W/OUT OBSTRUCTION OR GANGRENE: ICD-10-CM

## 2025-09-16 PROCEDURE — 99205 OFFICE O/P NEW HI 60 MIN: CPT
